# Patient Record
Sex: FEMALE | Race: BLACK OR AFRICAN AMERICAN | NOT HISPANIC OR LATINO | Employment: FULL TIME | URBAN - METROPOLITAN AREA
[De-identification: names, ages, dates, MRNs, and addresses within clinical notes are randomized per-mention and may not be internally consistent; named-entity substitution may affect disease eponyms.]

---

## 2017-01-04 ENCOUNTER — HOSPITAL ENCOUNTER (EMERGENCY)
Facility: HOSPITAL | Age: 37
Discharge: HOME | End: 2017-01-04
Attending: EMERGENCY MEDICINE
Payer: COMMERCIAL

## 2017-01-04 VITALS
RESPIRATION RATE: 12 BRPM | HEIGHT: 66 IN | SYSTOLIC BLOOD PRESSURE: 127 MMHG | HEART RATE: 79 BPM | WEIGHT: 141 LBS | TEMPERATURE: 98 F | OXYGEN SATURATION: 100 % | DIASTOLIC BLOOD PRESSURE: 73 MMHG | BODY MASS INDEX: 22.66 KG/M2

## 2017-01-04 DIAGNOSIS — K13.0 DRY LIPS: ICD-10-CM

## 2017-01-04 DIAGNOSIS — J00 RHINOPHARYNGITIS: Primary | ICD-10-CM

## 2017-01-04 LAB
B-HCG UR QL: NEGATIVE
CTP QC/QA: YES

## 2017-01-04 PROCEDURE — 25000003 PHARM REV CODE 250: Performed by: PHYSICIAN ASSISTANT

## 2017-01-04 PROCEDURE — 99284 EMERGENCY DEPT VISIT MOD MDM: CPT

## 2017-01-04 RX ORDER — CETIRIZINE HYDROCHLORIDE, PSEUDOEPHEDRINE HYDROCHLORIDE 5; 120 MG/1; MG/1
1 TABLET, FILM COATED, EXTENDED RELEASE ORAL DAILY
Qty: 30 TABLET | Refills: 0 | Status: SHIPPED | OUTPATIENT
Start: 2017-01-04 | End: 2017-01-14

## 2017-01-04 RX ORDER — KETOROLAC TROMETHAMINE 10 MG/1
10 TABLET, FILM COATED ORAL
Status: COMPLETED | OUTPATIENT
Start: 2017-01-04 | End: 2017-01-04

## 2017-01-04 RX ORDER — IBUPROFEN 600 MG/1
600 TABLET ORAL EVERY 6 HOURS PRN
Qty: 20 TABLET | Refills: 0 | Status: SHIPPED | OUTPATIENT
Start: 2017-01-04 | End: 2017-10-04

## 2017-01-04 RX ADMIN — KETOROLAC TROMETHAMINE 10 MG: 10 TABLET, FILM COATED ORAL at 05:01

## 2017-01-04 NOTE — DISCHARGE INSTRUCTIONS
Self-Care for Sore Throats  Sore throats occur for many reasons, such as colds, allergies, and infections caused by viruses or bacteria. In any case, your throat becomes red and sore. Your goal for self-care is to reduce your discomfort while giving your throat a chance to heal.    Moisten and Soothe Your Throat  · Try a sip of water first thing after waking up.  · Keep your throat moist by drinking 6 or more glasses of clear liquids every day.  · Run a cool-air humidifier in your room overnight.  · Avoid cigarette smoke.   · Suck on throat lozenges, cough drops, hard candy, ice chips, or frozen fruit-juice bars. Use the sugar-free versions if your diet or medical condition require them.  Gargle to Ease Irritation  Gargling every hour or 2 can ease irritation. Try gargling with 1 of these solutions:  · 1/4 teaspoon of salt in 1/2 cup of warm water  · An over-the-counter anesthetic gargle  Use Medication for More Relief  Over-the-counter medication can reduce sore throat symptoms. Ask your pharmacist if you have questions about which medication to use:  · Ease pain with anesthetic sprays. Aspirin or an aspirin substitute also helps. Remember, never give aspirin to anyone 18 or younger, or if you are already taking blood thinners.   · For sore throats caused by allergies, try antihistamines to block the allergic reaction.  · Remember: unless a sore throat is caused by a bacterial infection, antibiotics wont help you.  Prevent Future Sore Throats  · Stop smoking or reduce contact with secondhand smoke. Smoke irritates the tender throat lining.  · Limit contact with pets and with allergy-causing substances, such as pollen and mold.  · When youre around someone with a sore throat or cold, wash your hands frequently to keep viruses or bacteria from spreading.  · Dont strain your vocal cords.  Call Your Health Care Provider  Contact your doctor if you have:  · A temperature over 101°F (38.3°C)  · White spots on the  throat  · Great difficulty swallowing  · Trouble breathing  · A skin rash  · Recent exposure to someone else with strep bacteria  · Severe hoarseness and swollen glands in the neck or jaw   © 6069-3033 [employer]. [address] [place] [address], [place] [address]. All rights reserved. This information is not intended as a substitute for professional medical care. Always follow your healthcare professional's instructions.

## 2017-01-04 NOTE — ED PROVIDER NOTES
Encounter Date: 1/4/2017       History     Chief Complaint   Patient presents with    Sore Throat     described as burning and accompanied by lips burning x5 days; denies recent illness; denies anything making pain better or worse     Review of patient's allergies indicates:   Allergen Reactions    Bactrim [sulfamethoxazole-trimethoprim] Other (See Comments)     LOWER WBC     HPI Comments: Diana Titus 36 y.o. nontoxic female with no reported PMH presented to the ED with c/o URI symptoms for the past five days. She c/o congestion, post nasal drip, sore throat and chapped lips. She states that symptoms have gradually worsened since onset and are constant.  She denies any headache, vision changes, cough, neck pain or rigidity, SOB, wheezing, CP, N/V/D or rash. She denies any sick contacts. She reports that she is a nonsmoker and did not receive the influenza vaccine this year. She denies trying any medications today for the symptoms.    The history is provided by the patient.     History reviewed. No pertinent past medical history.  No past medical history pertinent negatives.  Past Surgical History   Procedure Laterality Date    Dilation and curettage of uterus       History reviewed. No pertinent family history.  Social History   Substance Use Topics    Smoking status: Never Smoker    Smokeless tobacco: Never Used    Alcohol use No     Review of Systems   Constitutional: Negative for activity change, appetite change, chills and fever.   HENT: Positive for congestion, postnasal drip, sinus pressure and sore throat. Negative for rhinorrhea, trouble swallowing and voice change.         Dry lips   Respiratory: Positive for cough. Negative for shortness of breath and wheezing.         Dry cough   Cardiovascular: Negative for chest pain.   Gastrointestinal: Negative for abdominal pain, nausea and vomiting.   Endocrine: Negative for polydipsia and polyuria.   Genitourinary: Negative for decreased urine volume and  dysuria.   Musculoskeletal: Negative for arthralgias, back pain and neck pain.   Skin: Negative for rash.   Neurological: Negative for dizziness, weakness, light-headedness and headaches.   Hematological: Does not bruise/bleed easily.       Physical Exam   Initial Vitals   BP Pulse Resp Temp SpO2   01/04/17 1627 01/04/17 1627 01/04/17 1627 01/04/17 1627 01/04/17 1627   127/73 79 12 98.3 °F (36.8 °C) 100 %     Physical Exam    Nursing note and vitals reviewed.  Constitutional: Vital signs are normal. She appears well-developed and well-nourished. She is cooperative.  Non-toxic appearance. She does not appear ill. No distress.   HENT:   Head: Normocephalic and atraumatic.   Right Ear: Tympanic membrane is not erythematous.   Left Ear: Tympanic membrane is not erythematous.   Nose: Mucosal edema present.   Mouth/Throat: Mucous membranes are not pale and not dry.   Minimal erythema of the oropharynx with no edema or exudate. Mucous membranes moist. Mild dry skin of lips noted   Eyes: Conjunctivae and lids are normal.   Neck: Neck supple. No rigidity.   Cardiovascular: Normal rate and regular rhythm.   Pulmonary/Chest: Breath sounds normal. No respiratory distress. She has no wheezes. She has no rhonchi.   Abdominal: Soft. Normal appearance and bowel sounds are normal. There is no tenderness. There is no rigidity and no guarding.   Musculoskeletal: Normal range of motion.   Neurological: She is alert and oriented to person, place, and time. She has normal strength. GCS eye subscore is 4. GCS verbal subscore is 5. GCS motor subscore is 6.   Skin: Skin is warm, dry and intact. No rash noted.   Psychiatric: She has a normal mood and affect. Her speech is normal and behavior is normal. Thought content normal.         ED Course   Procedures  Labs Reviewed - No data to display     Diana Sigue 36 y.o. nontoxic female with no reported PMH presented to the ED with c/o URI symptoms for the past five days. She c/o congestion,  post nasal drip, sore throat and chapped lips. She states that symptoms have gradually worsened since onset and are constant.  She denies any headache, vision changes, cough, neck pain or rigidity, SOB, wheezing, CP, N/V/D or rash. She denies any sick contacts. She reports that she is a nonsmoker and did not receive the influenza vaccine this year. She denies trying any medications today for the symptoms.  ROS positive for URI symptoms.  Physical exam reveals patient that ill however nontoxic in appearance. TM's clear, nose with edema, Patient with no erythema of the oropharynx. She does have Minimal erythema of the oropharynx with no edema or exudate. Mucous membranes moist. Mild dry skin of lips noted. Lungs clear free of wheeze or rhonchi. Heart regular rate and rhythm. Abdomen is soft and nontender with no rebound or rigidity. FROM of neck and all extremities with strength 5/5 bilaterally. Skin free of rash, pallor and diaphoresis.     DDX: influenza, viral URI, strep pharyngitis    ED management:  Patient is well appearing, afebrile and in no obvious distress. We will send home with symptomatic medications for this likely viral etiology as she is out of window for tamiflu and has a Centor score of 1.  She was counseled about symptom care, including ointment and lip balm for dry lips.    Impression/Plan: The primary encounter diagnosis was Rhinopharyngitis. A diagnosis of Dry lips was also pertinent to this visit. Discharged with motrin and zyrtec D.  Patient will follow up with Primary.  Patient cautioned on when to return to ED.  Pt. Understands and agrees with current treatment plan                         ED Course     Clinical Impression:   The primary encounter diagnosis was Rhinopharyngitis. A diagnosis of Dry lips was also pertinent to this visit.          JUDAH Sosa  01/05/17 0801

## 2017-01-04 NOTE — ED AVS SNAPSHOT
[facility]  [address]  [place] [address]               Diana Titus   2017  5:04 PM   ED    Description:  Female : 1980   Department:  [facility]           Your Care was Coordinated By:     Provider Role From To    Inocencio Harris Jr., MD Attending Provider [phone] --    JUDAH Sosa Physician Assistant 17 [phone] --      Reason for Visit     Sore Throat           Diagnoses this Visit        Comments    Rhinopharyngitis    -  Primary     Dry lips           ED Disposition     ED Disposition Condition Comment    Discharge             To Do List           Follow-up Information     Follow up with Jossy Hein MD In 1 week.    Specialty:  Internal Medicine    Contact information:    [address]  [address]  [facility]  [facility] [address]  [phone]         These Medications        Disp Refills Start End    cetirizine-pseudoephedrine 5-120 mg Tb12 30 tablet 0 2017    Take 1 tablet by mouth once daily. - Oral    Pharmacy: [facility] [address] [place] [address] Ph #: [phone]       ibuprofen (ADVIL,MOTRIN) 600 MG tablet 20 tablet 0 2017     Take 1 tablet (600 mg total) by mouth every 6 (six) hours as needed for Pain. - Oral    Pharmacy: [facility] [address] [place] [address] Ph #: [phone]         [facility]     [facility] Care [facility] -  Assistance  Registered nurses in the [facility] provide clinical advisement, health education, appointment booking, and other advisory services.  Call for this free service at [phone].             Medications           Message regarding Medications     Verify the changes and/or additions to your medication regime listed below are the same as discussed with your clinician today.  If any of these changes or additions are incorrect, please notify your healthcare  "provider.        START taking these NEW medications        Refills    cetirizine-pseudoephedrine 5-120 mg Tb12 0    Sig: Take 1 tablet by mouth once daily.    Class: Print    Route: Oral    ibuprofen (ADVIL,MOTRIN) 600 MG tablet 0    Sig: Take 1 tablet (600 mg total) by mouth every 6 (six) hours as needed for Pain.    Class: Print    Route: Oral      STOP taking these medications     methocarbamol (ROBAXIN) 750 MG Tab Take 1-2 tablets 3 times daily for pain and spasm           Verify that the below list of medications is an accurate representation of the medications you are currently taking.  If none reported, the list may be blank. If incorrect, please contact your healthcare provider. Carry this list with you in case of emergency.           Current Medications     cetirizine-pseudoephedrine 5-120 mg Tb12 Take 1 tablet by mouth once daily.    ibuprofen (ADVIL,MOTRIN) 600 MG tablet Take 1 tablet (600 mg total) by mouth every 6 (six) hours as needed for Pain.           Clinical Reference Information           Your Vitals Were     BP Pulse Temp Resp Height Weight    127/73 79 98.3 °F (36.8 °C) (Oral) 12 5' 6" (1.676 m) 64 kg (141 lb)    Last Period SpO2 BMI          12/11/2016 100% 22.76 kg/m2        Allergies as of 1/4/2017        Reactions    Bactrim [Sulfamethoxazole-trimethoprim] Other (See Comments)    LOWER WBC      Immunizations Administered on Date of Encounter - 1/4/2017     None      ED Micro, Lab, POCT     Start Ordered       Status Ordering Provider    01/04/17 0000 01/04/17 2734  POCT urine pregnancy     Comments:  This order was created through External Result Entry    Completed       ED Imaging Orders     None        Discharge Instructions         Self-Care for Sore Throats  Sore throats occur for many reasons, such as colds, allergies, and infections caused by viruses or bacteria. In any case, your throat becomes red and sore. Your goal for self-care is to reduce your discomfort while giving your " throat a chance to heal.    Moisten and Soothe Your Throat  · Try a sip of water first thing after waking up.  · Keep your throat moist by drinking 6 or more glasses of clear liquids every day.  · Run a cool-air humidifier in your room overnight.  · Avoid cigarette smoke.   · Suck on throat lozenges, cough drops, hard candy, ice chips, or frozen fruit-juice bars. Use the sugar-free versions if your diet or medical condition require them.  Gargle to Ease Irritation  Gargling every hour or 2 can ease irritation. Try gargling with 1 of these solutions:  · 1/4 teaspoon of salt in 1/2 cup of warm water  · An over-the-counter anesthetic gargle  Use Medication for More Relief  Over-the-counter medication can reduce sore throat symptoms. Ask your pharmacist if you have questions about which medication to use:  · Ease pain with anesthetic sprays. Aspirin or an aspirin substitute also helps. Remember, never give aspirin to anyone 18 or younger, or if you are already taking blood thinners.   · For sore throats caused by allergies, try antihistamines to block the allergic reaction.  · Remember: unless a sore throat is caused by a bacterial infection, antibiotics wont help you.  Prevent Future Sore Throats  · Stop smoking or reduce contact with secondhand smoke. Smoke irritates the tender throat lining.  · Limit contact with pets and with allergy-causing substances, such as pollen and mold.  · When youre around someone with a sore throat or cold, wash your hands frequently to keep viruses or bacteria from spreading.  · Dont strain your vocal cords.  Call Your Health Care Provider  Contact your doctor if you have:  · A temperature over 101°F (38.3°C)  · White spots on the throat  · Great difficulty swallowing  · Trouble breathing  · A skin rash  · Recent exposure to someone else with strep bacteria  · Severe hoarseness and swollen glands in the neck or jaw   © 8199-7845 [employer]. [address] [place] [address],  JUDAH Rodgers [address]. All rights reserved. This information is not intended as a substitute for professional medical care. Always follow your healthcare professional's instructions.          [facility] Sign-Up     Activating your [facility] account is as easy as 1-2-3!     1) Visit [email or web address], select Sign Up Now, enter this activation code and your date of birth, then select Next.  [email or web address]  Expires: 2/18/2017  5:15 PM      2) Create a username and password to use when you visit [facility] in the future and select a security question in case you lose your password and select Next.    3) Enter your e-mail address and click Sign Up!    Additional Information  If you have questions, please e-mail [email or web address] or call [phone] to talk to our [facility] staff. Remember, [facility] is NOT to be used for urgent needs. For medical emergencies, dial 911.          [facility] complies with applicable Federal civil rights laws and does not discriminate on the basis of race, color, national origin, age, disability, or sex.        Language Assistance Services     ATTENTION: Language assistance services are available, free of charge. Please call [phone].      ATENCIÓN: Si habla kimberly, tiene a sarmiento disposición servicios gratuitos de asistencia lingüística. Llame al [phone].     LISETTE Ý: N?u b?n nói Ti?ng Vi?t, có các d?ch v? h? tr? ngôn ng? mi?n phí dành cho b?n. G?i s? [phone].

## 2017-10-04 ENCOUNTER — HOSPITAL ENCOUNTER (EMERGENCY)
Facility: HOSPITAL | Age: 37
Discharge: HOME | End: 2017-10-04
Attending: EMERGENCY MEDICINE
Payer: COMMERCIAL

## 2017-10-04 VITALS
HEART RATE: 78 BPM | TEMPERATURE: 98 F | OXYGEN SATURATION: 97 % | BODY MASS INDEX: 24.27 KG/M2 | SYSTOLIC BLOOD PRESSURE: 113 MMHG | WEIGHT: 151 LBS | HEIGHT: 66 IN | DIASTOLIC BLOOD PRESSURE: 71 MMHG | RESPIRATION RATE: 20 BRPM

## 2017-10-04 DIAGNOSIS — H10.9 CONJUNCTIVITIS OF BOTH EYES, UNSPECIFIED CONJUNCTIVITIS TYPE: Primary | ICD-10-CM

## 2017-10-04 PROCEDURE — 99283 EMERGENCY DEPT VISIT LOW MDM: CPT

## 2017-10-04 RX ORDER — CIPROFLOXACIN HYDROCHLORIDE 3 MG/ML
1 SOLUTION/ DROPS OPHTHALMIC
Qty: 5 ML | Refills: 0 | Status: SHIPPED | OUTPATIENT
Start: 2017-10-04 | End: 2017-10-04

## 2017-10-04 RX ORDER — CIPROFLOXACIN HYDROCHLORIDE 3 MG/ML
1 SOLUTION/ DROPS OPHTHALMIC
Qty: 5 ML | Refills: 0 | Status: SHIPPED | OUTPATIENT
Start: 2017-10-04 | End: 2019-01-07

## 2017-10-04 NOTE — ED PROVIDER NOTES
Encounter Date: 10/4/2017       History     Chief Complaint   Patient presents with    Eye Problem     redness, itching, drainage to lt. eye x3 days.      CHIEF COMPLAINT: Patient presents with: redness, itching, drainage to lt. eye x3 days.     HISTORY OF PRESENT ILLNESS: Diana Titus who is a 37 y.o. presents to the emergency department today with complaint of redness itching and dryness to her left eye for last 3 days.  She denies any change in vision.  She denies any pain with her extraocular movements.  She has not had any fever, chills or sweats.  She does wake up and have crusting to the left eye.  She does note that she started to experience little bit of increased tearing to the right eye and noticed some redness of the temporal aspect of the right eye.    REVIEW OF SYSTEMS:  Constitutional: No fever, no chills.  Eyes: See above.  HENT: No nasal drainage. No ear ache. No sore throat.   Cardiovascular: No chest pain, no palpitations.  Respiratory: No cough, no shortness of breath.  Gastrointestinal: No abdominal pain, no vomiting. No diarrhea  Genitourinary: No hematuria, dysuria, urgency.  Musculoskeletal: No back pain.  Skin: No rashes, no lesions.  Neurological: No headache, no focal weakness.    Otherwise remaining ROS negative     ALLERGIES REVIEWED  MEDICATIONS REVIEWED  PMH/PSH/SOC/FH REVIEWED     The history is provided by the patient.    Nursing/Ancillary staff note reviewed.                  Review of patient's allergies indicates:   Allergen Reactions    Bactrim [sulfamethoxazole-trimethoprim] Other (See Comments)     LOWER WBC     No past medical history on file.  Past Surgical History:   Procedure Laterality Date    DILATION AND CURETTAGE OF UTERUS       No family history on file.  Social History   Substance Use Topics    Smoking status: Never Smoker    Smokeless tobacco: Never Used    Alcohol use No     Review of Systems   All other systems reviewed and are negative.      Physical Exam      Initial Vitals [10/04/17 0017]   BP Pulse Resp Temp SpO2   113/71 75 16 98.7 °F (37.1 °C) 97 %      MAP       85         Physical Exam    Nursing note and vitals reviewed.  Constitutional: She appears well-developed and well-nourished.   HENT:   Head: Normocephalic and atraumatic.   Nose: Nose normal.   Mouth/Throat: Oropharynx is clear and moist.   Eyes: EOM are normal. Pupils are equal, round, and reactive to light. No scleral icterus.   Erythema to the left conjunctiva on both the temporal and nasal aspects. + crusting at eyelashes. Erythema to right at temporal aspect.  No pain with extraocular movements.   Neck: Normal range of motion. Neck supple. No JVD present.   Cardiovascular: Normal rate, regular rhythm, normal heart sounds and intact distal pulses. Exam reveals no gallop and no friction rub.    No murmur heard.  Pulmonary/Chest: Breath sounds normal. No stridor. No respiratory distress. She has no wheezes. She exhibits no tenderness.   Abdominal: Soft. Bowel sounds are normal. She exhibits no distension and no mass. There is no tenderness. There is no rebound and no guarding.   Musculoskeletal: Normal range of motion. She exhibits no edema or tenderness.   Neurological: She is alert and oriented to person, place, and time. She has normal strength. No cranial nerve deficit.   Skin: Skin is warm and dry. No rash noted. No pallor.   Psychiatric: She has a normal mood and affect. Thought content normal.         ED Course   Procedures  Labs Reviewed - No data to display          Medical Decision Making:   Differential Diagnosis:   Conjunctivitis, allergies, perioribital cellulitis, orbital cellulitis  ED Management:  This is a 37 year female presents to the emergency Department today with conjunctivitis.  She has a fairly typical presentation.  She has no pain with extraocular movements.  She has no eyelid edema.  At this time we'll treat her for her conjunctivitis and have her follow-up with her primary  care physician.  Patient is comfortable with this plan and comfortable going home at this time. After taking into careful account the historical factors and physical exam findings of the patient's presentation today no acute emergent medical condition has been identified. The patient appears to be low risk for an emergent medical condition and I feel it is safe and appropriate at this time for the patient to be discharged to follow-up as detailed in their discharge instructions for reevaluation and possible continued outpatient workup and management. I have discussed the specifics of the workup with the patient and the patient has verbalized understanding of the details of the workup, the diagnosis, the treatment plan, and the need for outpatient follow-up.  Although the patient has no emergent etiology today this does not preclude the development of an emergent condition so in addition, I have advised the patient that they can return to the ED and/or activate EMS at any time with worsening of their symptoms, change of their symptoms, or with any other medical complaint.  The patient remained comfortable and stable during their visit in the ED.  Discharge and follow-up instructions discussed with the patient who expressed understanding and willingness to comply with my recommendations.     This medical record was prepared using voice dictation software. There may be phonetic errors.                   ED Course      Clinical Impression:   The encounter diagnosis was Conjunctivitis of both eyes, unspecified conjunctivitis type.                           Linda Gilman MD  10/04/17 8544

## 2017-10-04 NOTE — ED NOTES
Patient identifiers for Shauntell Sigue checked and correct.  LOC: The patient is awake, alert and aware of environment with an appropriate affect, the patient is oriented x 3 and speaking appropriately.  APPEARANCE: Patient resting comfortably and in no acute distress, patient is clean and well groomed, patient's clothing are properly fastened. Left eye red.  SKIN: The skin is warm and dry, patient has normal skin turgor and moist mucus membranes.  MUSKULOSKELETAL: Patient moving all extremities well, no obvious swelling or deformities noted.

## 2017-10-04 NOTE — ED NOTES
Complaining of itching and redness to left eye x 3 days.  States she wakes open crust is around eye.  Denies any blurred vision.

## 2019-01-07 ENCOUNTER — HOSPITAL ENCOUNTER (EMERGENCY)
Facility: HOSPITAL | Age: 39
Discharge: HOME | End: 2019-01-07
Payer: COMMERCIAL

## 2019-01-07 VITALS
RESPIRATION RATE: 15 BRPM | WEIGHT: 153 LBS | OXYGEN SATURATION: 98 % | SYSTOLIC BLOOD PRESSURE: 123 MMHG | DIASTOLIC BLOOD PRESSURE: 72 MMHG | TEMPERATURE: 99 F | BODY MASS INDEX: 24.69 KG/M2 | HEART RATE: 88 BPM

## 2019-01-07 DIAGNOSIS — M25.562 ACUTE PAIN OF LEFT KNEE: Primary | ICD-10-CM

## 2019-01-07 DIAGNOSIS — S83.92XA SPRAIN OF LEFT KNEE, UNSPECIFIED LIGAMENT, INITIAL ENCOUNTER: ICD-10-CM

## 2019-01-07 DIAGNOSIS — M25.462 EFFUSION OF LEFT KNEE: ICD-10-CM

## 2019-01-07 PROCEDURE — 99283 EMERGENCY DEPT VISIT LOW MDM: CPT | Mod: ER

## 2019-01-07 PROCEDURE — 25000003 PHARM REV CODE 250: Mod: ER | Performed by: PHYSICIAN ASSISTANT

## 2019-01-07 RX ORDER — IBUPROFEN 600 MG/1
600 TABLET ORAL
Status: COMPLETED | OUTPATIENT
Start: 2019-01-07 | End: 2019-01-07

## 2019-01-07 RX ORDER — NAPROXEN 500 MG/1
500 TABLET ORAL 2 TIMES DAILY WITH MEALS
Qty: 14 TABLET | Refills: 0 | Status: SHIPPED | OUTPATIENT
Start: 2019-01-07 | End: 2019-01-14

## 2019-01-07 RX ADMIN — IBUPROFEN 600 MG: 600 TABLET, FILM COATED ORAL at 04:01

## 2019-01-07 NOTE — DISCHARGE INSTRUCTIONS
Elevate and ice for discomfort.  Wear compression to assist in support and stabilization.  Take prescribed medication as directed as needed for discomfort.  Ambulate and range as tolerated.  Follow up with PCP for continued care and management.

## 2019-01-07 NOTE — ED PROVIDER NOTES
Encounter Date: 1/7/2019       History     Chief Complaint   Patient presents with    Knee Pain     knee pain for one week      Patient is a 38-year-old female presenting to ED today with complaint of left knee pain x1 week.  Patient denies any injury or trauma, slips or falls, twisting injury or any past history of left knee injury. Patient reports able to ambulate despite her discomfort.  Patient reports mild swelling noted last week although it has since improved.  Patient does admit that she is on her feet all day long daily.  Patient denies any numbness, tingling, fevers, sweats, chills, chest pain, shortness of breath or any other physical complaints at this time.  No alleviating factors noted.  No OTC meds attempted for relief.      The history is provided by the patient.     Review of patient's allergies indicates:   Allergen Reactions    Bactrim [sulfamethoxazole-trimethoprim] Other (See Comments)     LOWER WBC     No past medical history on file.  Past Surgical History:   Procedure Laterality Date    DILATION AND CURETTAGE OF UTERUS       No family history on file.  Social History     Tobacco Use    Smoking status: Never Smoker    Smokeless tobacco: Never Used   Substance Use Topics    Alcohol use: No    Drug use: No     Review of Systems   Constitutional: Negative for chills, diaphoresis, fatigue and fever.   HENT: Negative for congestion, ear pain, sinus pain, sore throat and trouble swallowing.    Eyes: Negative for photophobia, pain, redness and visual disturbance.   Respiratory: Negative for cough, shortness of breath, wheezing and stridor.    Cardiovascular: Negative for chest pain, palpitations and leg swelling.   Gastrointestinal: Negative for abdominal pain, diarrhea, nausea and vomiting.   Endocrine: Negative.    Genitourinary: Negative for difficulty urinating, dysuria, flank pain and hematuria.   Musculoskeletal: Positive for arthralgias. Negative for back pain, myalgias and neck pain.         L knee pain   Skin: Negative.    Allergic/Immunologic: Negative.    Neurological: Negative for dizziness, tremors, weakness, numbness and headaches.   Hematological: Negative.    Psychiatric/Behavioral: Negative.        Physical Exam     Initial Vitals [01/07/19 1610]   BP Pulse Resp Temp SpO2   123/72 88 15 98.6 °F (37 °C) 98 %      MAP       --         Physical Exam    Nursing note and vitals reviewed.  Constitutional: Vital signs are normal. She appears well-developed and well-nourished. She is not diaphoretic. No distress.   Patient is a 38-year-old female sitting upright in no acute distress, nontoxic, AAO x4 and breathing comfortably on room air.  Normal steady gait on ambulation to ED room.   HENT:   Head: Normocephalic and atraumatic.   Right Ear: External ear normal.   Left Ear: External ear normal.   Nose: Nose normal.   Mouth/Throat: Oropharynx is clear and moist.   Eyes: Conjunctivae and EOM are normal. Pupils are equal, round, and reactive to light.   Neck: Trachea normal and normal range of motion. Neck supple.   Cardiovascular: Normal rate, regular rhythm, normal heart sounds, intact distal pulses and normal pulses.   Pulses:       Radial pulses are 2+ on the right side, and 2+ on the left side.        Dorsalis pedis pulses are 2+ on the right side, and 2+ on the left side.        Posterior tibial pulses are 2+ on the right side, and 2+ on the left side.   Pulmonary/Chest: Effort normal and breath sounds normal. No accessory muscle usage or stridor. No tachypnea. No respiratory distress. She has no decreased breath sounds. She has no wheezes. She exhibits no tenderness.   Abdominal: Soft. Bowel sounds are normal. She exhibits no distension. There is no tenderness. There is no rigidity, no rebound, no guarding and no CVA tenderness.   Musculoskeletal: Normal range of motion. She exhibits no edema or tenderness.   Patient with complaint of left knee pain although she has full flexion and  extension despite her discomfort, normal patellar tracking, no joint laxity noted in varus and valgus testing and I am unable to appreciate any tenderness to palpation.  There is a tiny medial joint line effusion present noted although there is no erythema, no warmth, no concern for septic arthritis.  No Baker cyst noted. Negative meniscal testing.  No tibial or femur bony tenderness noted. No bony deformity noted. Patient with normal steady gait, distal pulses intact and normal sensation throughout.  No imaging indicated at this time, likely overuse injury versus sprain.   Lymphadenopathy:     She has no cervical adenopathy.   Neurological: She is alert and oriented to person, place, and time. She has normal strength. She displays no tremor. No sensory deficit. She exhibits normal muscle tone. She displays no seizure activity. Coordination normal. GCS score is 15. GCS eye subscore is 4. GCS verbal subscore is 5. GCS motor subscore is 6.   Neurovascularly intact   Skin: Skin is warm and dry. Capillary refill takes less than 2 seconds. No rash noted. No erythema.         ED Course   Procedures  Labs Reviewed - No data to display       Imaging Results    None          Medical Decision Making:   Initial Assessment:   Patient is a 38-year-old female presenting to ED today with complaint of left knee pain x1 week.  Patient denies any injury or trauma, slips or falls, twisting injury or any past history of left knee injury. Patient reports able to ambulate despite her discomfort.  Patient reports mild swelling noted last week although it has since improved.  Patient does admit that she is on her feet all day long daily.  Patient denies any numbness, tingling, fevers, sweats, chills, chest pain, shortness of breath or any other physical complaints at this time.  No alleviating factors noted.  No OTC meds attempted for relief.  Differential Diagnosis:   Overuse injury  Left knee sprain  Arthritis    ED  Management:  Discussed care plan with patient.  Discussed clinical exam findings and left knee with tiny medial joint line effusion although she has full active range of motion with no apparent discomfort wall ranging, she has a normal steady gait, distal pulses intact, normal sensation throughout, no palpable tenderness and she is unable to to point any focal tenderness. No discoloration, no erythema, no warmth, no concern for septic arthritis.  Normal patellar tracking and no joint laxity.  Patient has a very stable on impressive knee joint on clinical exam.  No imaging indicated at this time, especially in the setting of nontraumatic discomfort with a normal steady gait.  Patient likely has an overuse injury versus sprain.  Patient will be treated symptomatically with anti-inflammatories and she was instructed to follow up with PCP for continued care and management.  Patient is stable, no acute distress, nontoxic, neurovascular intact, vital signs stable, all questions answered.                      Clinical Impression:   The primary encounter diagnosis was Acute pain of left knee. Diagnoses of Sprain of left knee, unspecified ligament, initial encounter and Effusion of left knee were also pertinent to this visit.                             Afshan Vanessa PA-C  01/07/19 5818

## 2020-12-09 ENCOUNTER — HOSPITAL ENCOUNTER (EMERGENCY)
Facility: HOSPITAL | Age: 40
Discharge: HOME | End: 2020-12-09
Payer: COMMERCIAL

## 2020-12-09 VITALS
SYSTOLIC BLOOD PRESSURE: 109 MMHG | OXYGEN SATURATION: 97 % | DIASTOLIC BLOOD PRESSURE: 69 MMHG | BODY MASS INDEX: 26.47 KG/M2 | HEART RATE: 65 BPM | RESPIRATION RATE: 16 BRPM | TEMPERATURE: 98 F | WEIGHT: 164 LBS

## 2020-12-09 DIAGNOSIS — Z77.098 CHEMICAL EXPOSURE OF EYE: Primary | ICD-10-CM

## 2020-12-09 PROCEDURE — 25000003 PHARM REV CODE 250: Performed by: NURSE PRACTITIONER

## 2020-12-09 PROCEDURE — 99283 EMERGENCY DEPT VISIT LOW MDM: CPT

## 2020-12-09 RX ORDER — ERYTHROMYCIN 5 MG/G
OINTMENT OPHTHALMIC
Status: COMPLETED | OUTPATIENT
Start: 2020-12-09 | End: 2020-12-09

## 2020-12-09 RX ORDER — PROPARACAINE HYDROCHLORIDE 5 MG/ML
1 SOLUTION/ DROPS OPHTHALMIC
Status: COMPLETED | OUTPATIENT
Start: 2020-12-09 | End: 2020-12-09

## 2020-12-09 RX ORDER — ERYTHROMYCIN 5 MG/G
OINTMENT OPHTHALMIC
Qty: 1 TUBE | Refills: 0 | Status: SHIPPED | OUTPATIENT
Start: 2020-12-09

## 2020-12-09 RX ADMIN — FLUORESCEIN SODIUM 1 EACH: 1 STRIP OPHTHALMIC at 11:12

## 2020-12-09 RX ADMIN — ERYTHROMYCIN 1 INCH: 5 OINTMENT OPHTHALMIC at 12:12

## 2020-12-09 RX ADMIN — PROPARACAINE HYDROCHLORIDE 1 DROP: 5 SOLUTION/ DROPS OPHTHALMIC at 11:12

## 2020-12-09 NOTE — ED PROVIDER NOTES
Encounter Date: 12/9/2020       History     Chief Complaint   Patient presents with    Eye Pain     p[t reports was at work loading boxes and lysol spilled onto face. reports lysol split in left eye and mouth but reports did not swallow it. reprots pain to left eye and blurry vision. happened around 0900 today      40-year-old female presents emergency room with reports of having lysol sprayed into the right eye when at work this morning. 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 Reports rinsing the eye with flush prior to arrival. Pt also reports improvement since washing it out however is still having occasional burning/pain.     The history is provided by the patient. No  was used.     Review of patient's allergies indicates:   Allergen Reactions    Bactrim [sulfamethoxazole-trimethoprim] Other (See Comments)     LOWER WBC     No past medical history on file.  Past Surgical History:   Procedure Laterality Date    DILATION AND CURETTAGE OF UTERUS       No family history on file.  Social History     Tobacco Use    Smoking status: Never Smoker    Smokeless tobacco: Never Used   Substance Use Topics    Alcohol use: No    Drug use: No     Review of Systems   Eyes: Positive for pain and redness.   All other systems reviewed and are negative.      Physical Exam     Initial Vitals [12/09/20 1047]   BP Pulse Resp Temp SpO2   (!) 160/70 75 20 98.5 °F (36.9 °C) 100 %      MAP       --         Physical Exam    Nursing note and vitals reviewed.  Constitutional: She appears well-developed and well-nourished.   HENT:   Head: Normocephalic.   Right Ear: Hearing and tympanic membrane normal.   Left Ear: Hearing and tympanic membrane normal.   Nose: Nose normal.   Mouth/Throat: Oropharynx is clear and moist.   Eyes: EOM and lids are normal. Pupils are equal, round, and reactive to light. Right eye exhibits no discharge. No foreign body present in the right eye.  Right conjunctiva has no hemorrhage. Left conjunctiva has no hemorrhage. Right eye exhibits normal extraocular motion and no nystagmus. Left eye exhibits normal extraocular motion and no nystagmus.   Slit lamp exam:       The right eye shows no corneal abrasion, no corneal flare, no corneal ulcer and no foreign body.   Neck: Normal range of motion. No neck rigidity.   Cardiovascular: Normal rate.   Pulmonary/Chest: Breath sounds normal. No respiratory distress. She has no wheezes. She has no rhonchi.   Abdominal: Soft. There is no abdominal tenderness.   Musculoskeletal: Normal range of motion.   Neurological: She is alert and oriented to person, place, and time.   Skin: Skin is warm and dry. No rash noted.   Psychiatric: She has a normal mood and affect. Her behavior is normal. Judgment and thought content normal.         ED Course   Procedures  Labs Reviewed - No data to display       Imaging Results    None          Medical Decision Making:   Initial Assessment:   40-year-old female presents emergency room with reports of having lysol sprayed into the right eye when at work this morning. 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 Reports rinsing the eye with flush prior to arrival. Pt also reports improvement since washing it out however is still having occasional burning/pain.     The history is provided by the patient. No  was used.     ED Management:  ED Course as of Dec 09 1217  Wed Dec 09, 2020  1144 Pt states she is not able to perform a visual exam due to not having her glasses which she needs to see.     (DT)  1213 Eye was irrigated well, No corneal abrasion or ulceration noted. Pt tolerated eye exam well. Minimal erythema noted. EOMI, She will be referred to opthalmology and will be prescribed erythromycin ointment, She is stable at this time for dc     (DT)    ED Course User Index  (DT) Ingris Munoz NP                     ED Course as of  Dec 09 1217   Wed Dec 09, 2020   1144 Pt states she is not able to perform a visual exam due to not having her glasses which she needs to see.     [DT]   1213 Eye was irrigated well, No corneal abrasion or ulceration noted. Pt tolerated eye exam well. Minimal erythema noted. EOMI, She will be referred to opthalmology and will be prescribed erythromycin ointment, She is stable at this time for dc     [DT]      ED Course User Index  [DT] Ingris Munoz NP            Clinical Impression:     ICD-10-CM ICD-9-CM   1. Chemical exposure of eye  Z77.098 V87.2                          ED Disposition Condition    Discharge Stable        ED Prescriptions     Medication Sig Dispense Start Date End Date Auth. Provider    erythromycin (ROMYCIN) ophthalmic ointment Place a 1/2 inch ribbon of ointment into the lower eyelid. 1 Tube 12/9/2020  Ingris Munoz NP        Follow-up Information     Follow up With Specialties Details Why Contact Info    Jossy Hein MD Internal Medicine Schedule an appointment as soon as possible for a visit in 2 days  [address]  [address]  [facility] [address]  [phone]                                         Ingris Munoz NP  12/09/20 1218

## 2020-12-09 NOTE — ED NOTES
Pt was unable to perform the visual acuity at this time.Pt reports she was driven here by someone and her glasses are in her car at the job parking lot. Provider notified.

## 2020-12-09 NOTE — DISCHARGE INSTRUCTIONS
Referral sent to eye doctor. You will need close follow up. Use meds as directed. Return to the ER for any worsening of condition.

## 2020-12-09 NOTE — ED NOTES
Pt. Was at work this morning and while she was pulling a box of  down from the shelf she was splashed in the eyes and face with lysol . She flushed her eyes immediately. She c/o some mild burning to Rt. Eye. Minimal redness, irritation noted to eyes. No tearing.

## 2022-01-28 ENCOUNTER — LAB VISIT (OUTPATIENT)
Dept: PRIMARY CARE CLINIC | Facility: OTHER | Age: 42
End: 2022-01-28
Attending: INTERNAL MEDICINE

## 2022-01-28 DIAGNOSIS — U07.1 COVID-19: Primary | ICD-10-CM

## 2022-01-28 LAB
CTP QC/QA: YES
SARS-COV-2 AG RESP QL IA.RAPID: NEGATIVE

## 2022-01-28 PROCEDURE — 87811 SARS-COV-2 COVID19 W/OPTIC: CPT

## 2022-01-28 NOTE — PROGRESS NOTES
Nasal specimen collected.   BinaxNOW test performed in the presence of patient and results loaded into EPIC. Pt instructed with following instructions:.  Instructions for Patients with Confirmed or Suspected COVID-19    If you are awaiting your test result, you will either be called or it will be released to the patient portal.  If you have any questions about your test, please visit [email or web address] or call our COVID-19 information line at [phone].      Please isolate yourself at home.  You may leave home and/or return to work once the following conditions are met:    If you were not hospitalized and are not severely immunocompromised*:   More than 10 days since symptoms first appeared AND   More than 24 hours fever free without medications AND   Symptoms have improved     If you were hospitalized OR are severely immunocompromised*:   More than 20 days since symptoms first appeared   More than 24 hours fever free without medications   Symptoms have improved    If you had no symptoms but tested positive:   More than 10 days since the date of the first positive test (20 days if severely immunocompromised).   If you develop symptoms, then use the guidelines above.     *Definition of severely immunocompromised:  - Current chemotherapy for cancer  - Untreated HIV with CD4 count less than 200  - Combined primary immunodeficiency disorder  - Prednisone more than 20 mg per day for more than 14 days  - Post-transplant patients

## 2023-02-22 DIAGNOSIS — Z12.31 SCREENING MAMMOGRAM, ENCOUNTER FOR: Primary | ICD-10-CM

## 2023-04-01 ENCOUNTER — HOSPITAL ENCOUNTER (EMERGENCY)
Facility: HOSPITAL | Age: 43
Discharge: HOME | End: 2023-04-01
Attending: EMERGENCY MEDICINE
Payer: COMMERCIAL

## 2023-04-01 DIAGNOSIS — N39.0 URINARY TRACT INFECTION WITH HEMATURIA, SITE UNSPECIFIED: Primary | ICD-10-CM

## 2023-04-01 DIAGNOSIS — R31.9 URINARY TRACT INFECTION WITH HEMATURIA, SITE UNSPECIFIED: Primary | ICD-10-CM

## 2023-04-01 PROCEDURE — 99283 EMERGENCY DEPT VISIT LOW MDM: CPT | Mod: ER

## 2023-04-01 RX ORDER — CEPHALEXIN 500 MG/1
CAPSULE ORAL
Status: DISPENSED
Start: 2023-04-01 | End: 2023-04-01

## 2023-04-01 NOTE — ED PROVIDER NOTES
Emergency Department Encounter  Provider Note  Encounter Date: 4/1/2023    Patient Name: Diana Titus  MRN: [id number]    History of Present Illness   HPI  History of Present Illness:    Chief Complaint: No chief complaint on file.      42f pw 2d of LLQ pain radiating to suprapubic area, dysuria similar to previous UTI sxs. no fevers, chills, back pain. took nothing for her sxs. denies n/v, diarrhea, sick contacts. denies vaginal bleeding, discharge. LMP 3/10.     The following PMH/PSH/SocHx/FamHx has been reviewed by myself:    No past medical history on file.  Past Surgical History:   Procedure Laterality Date    DILATION AND CURETTAGE OF UTERUS       Social History     Tobacco Use    Smoking status: Never    Smokeless tobacco: Never   Substance Use Topics    Alcohol use: No    Drug use: No     No family history on file.    Allergies reviewed:  Review of patient's allergies indicates:   Allergen Reactions    Bactrim [sulfamethoxazole-trimethoprim] Other (See Comments)     LOWER WBC       Medications reviewed:  Medication List with Changes/Refills   Current Medications    ERYTHROMYCIN (ROMYCIN) OPHTHALMIC OINTMENT    Place a 1/2 inch ribbon of ointment into the lower eyelid.       ROS  Review of Systems:    Constitutional:  Negative for fever.   HENT:  Negative for sore throat.    Eyes:  Negative for redness.   Respiratory:  Negative for shortness of breath.    Cardiovascular:  Negative for chest pain.   Gastrointestinal:  Negative for nausea.   Genitourinary:  Positive for dysuria.   Musculoskeletal:  Negative for back pain.   Skin:  Negative for rash.   Neurological:  Negative for weakness.   Hematological:  Does not bruise/bleed easily.   Psychiatric/Behavioral:  The patient is not nervous/anxious.      Physical Exam   Physical Exam    Initial Vitals   BP Pulse Resp Temp SpO2   -- -- -- -- --      MAP       --           Triage vital signs reviewed.    Constitutional: Well-nourished, well-developed. Not in acute  distress.  HENT: Normocephalic, atraumatic. Moist mucous membranes.  Eyes: No conjunctival injection.  Resp: Normal respiratory effort, breathing unlabored.  Cardio: Regular rate and rhythm.  GI: Abdomen non-distended. LLQ, suprapubic ttp, non peritoneal  MSK: Extremities without any deformities noted. No lower extremity edema.  Skin: Warm and dry. No rashes or lesions noted.  Neuro: Awake and alert. Moves all extremities.    ED Course   Procedures    Medical Decision Making    History Acquisition     The history is provided by the patient.     Review of prior external/non ED notes:     Differential Diagnoses   Based on available information and initial assessment, the working differential diagnoses considered during this evaluation include but are not limited to   UTI/pyelonephritis, kidney stone, urinary retention, urethritis, appendicitis, prostatitis, testicular torsion/mass, incarcerated/strangulated hernia.  .    EKG   EKG ordered and independently reviewed by me:       Labs   Lab tests ordered and independently reviewed by me:    Labs Reviewed - No data to display  UA: nitrite+ ua, rbcs, wbcs    Imaging   Imaging ordered and independently reviewed by me:   Imaging Results    None              Additional Consideration   Diana Titus  presents to the emergency Department today with dysuria, lower abd pain. UA grossly infected. Keflex, dc. No indication for imaging or bloodwork. Pt was seen during Epic downtime; paper rx for keflex given.     Additional testing considered but not indicated during the course of this workup: further imaging and labwork, not indicated  Co-morbidities/chronic illness/exacerbation of chronic illness considered which impacted clinical decision making: none  Procedures done in the ED or plan for the OR: No  Social determinants of care considered during development of treatment plan include: Decreased medical literacy  Discussion of management or test interpretation with external  provider: No  DNR discussion: No    The patient's list of active medications and allergies as documented per RN staff has been reviewed.  Medications given in the ED and/or prescribed: Medications - No data to display               Explanation of disposition: discharge     Clinical Impression:     1. Urinary tract infection with hematuria, site unspecified         All results from the workup were reviewed with the patient/family in detail. I discussed with the patient and/or the family/caregiver that today's evaluation in the ED does not suggest any emergent or life-threatening medical conditions that would require hospitalization or immediate intervention beyond what was provided in the ED. I believe the patient is safe for discharge.  However, a reassuring evaluation in the ED does not preclude the presence or development of a serious or life-threatening condition. As such, strict return precautions were discussed with the patient expressing understanding of instructions, and all questions answered. The patient/family communicates understanding of all this information and all remaining questions and concerns were addressed at this time.    The patient/family has been provided with verbal and printed direction regarding our final diagnosis(es) as well as instructions regarding use of OTC and/or Rx medications intended to manage the patient's aforementioned conditions including:  ED Prescriptions    None       The patient's condition does not warrant review of the  and prescription of controlled substances.      ED Disposition Condition    Discharge                Tamara Burgos MD  04/01/23 0507

## 2023-04-01 NOTE — ED NOTES
This patient was seen during [facility] down time  Chart is to be scanned into medical records.  Please refer there for pt's visit  Please refer to MD notes as well

## 2023-07-02 ENCOUNTER — HOSPITAL ENCOUNTER (EMERGENCY)
Facility: HOSPITAL | Age: 43
Discharge: HOME | End: 2023-07-02
Attending: STUDENT IN AN ORGANIZED HEALTH CARE EDUCATION/TRAINING PROGRAM
Payer: COMMERCIAL

## 2023-07-02 VITALS
OXYGEN SATURATION: 98 % | HEART RATE: 75 BPM | BODY MASS INDEX: 30.02 KG/M2 | SYSTOLIC BLOOD PRESSURE: 131 MMHG | TEMPERATURE: 99 F | WEIGHT: 186 LBS | DIASTOLIC BLOOD PRESSURE: 75 MMHG | RESPIRATION RATE: 17 BRPM

## 2023-07-02 DIAGNOSIS — S99.912A LEFT ANKLE INJURY: ICD-10-CM

## 2023-07-02 PROBLEM — R60.9 EDEMA: Status: ACTIVE | Noted: 2021-09-07

## 2023-07-02 PROBLEM — R60.0 LOCALIZED EDEMA: Status: ACTIVE | Noted: 2021-09-09

## 2023-07-02 LAB
B-HCG UR QL: NEGATIVE
CTP QC/QA: YES

## 2023-07-02 PROCEDURE — 25000003 PHARM REV CODE 250: Mod: ER | Performed by: PHYSICIAN ASSISTANT

## 2023-07-02 PROCEDURE — 99283 EMERGENCY DEPT VISIT LOW MDM: CPT | Mod: ER

## 2023-07-02 PROCEDURE — 81025 URINE PREGNANCY TEST: CPT | Mod: ER | Performed by: PHYSICIAN ASSISTANT

## 2023-07-02 RX ORDER — ACETAMINOPHEN 500 MG
1000 TABLET ORAL
Status: COMPLETED | OUTPATIENT
Start: 2023-07-02 | End: 2023-07-02

## 2023-07-02 RX ORDER — NAPROXEN 500 MG/1
500 TABLET ORAL 2 TIMES DAILY WITH MEALS
Qty: 14 TABLET | Refills: 0 | Status: SHIPPED | OUTPATIENT
Start: 2023-07-02

## 2023-07-02 RX ADMIN — ACETAMINOPHEN 1000 MG: 500 TABLET ORAL at 05:07

## 2023-07-02 NOTE — ED PROVIDER NOTES
Encounter Date: 7/2/2023       History     Chief Complaint   Patient presents with    Ankle Pain     Reports fall yesterday and is still having L ankle pain. No meds today for pain. +swelling to ankle      HPI: Diana Titus, a 42 y.o. female  has no past medical history on file.     She presents to the ED for evaluation of left ankle pain after inversion injury yesterday.  Pain with ambulation and touch.  Lateral ankle swelling also noted.  No previous history of fracture or surgery to site.  Tried Tylenol with little improvement of her pain.        The history is provided by the patient.   Review of patient's allergies indicates:   Allergen Reactions    Bactrim [sulfamethoxazole-trimethoprim] Other (See Comments)     LOWER WBC     History reviewed. No pertinent past medical history.  Past Surgical History:   Procedure Laterality Date    DILATION AND CURETTAGE OF UTERUS       History reviewed. No pertinent family history.  Social History     Tobacco Use    Smoking status: Never    Smokeless tobacco: Never   Substance Use Topics    Alcohol use: No    Drug use: No     Review of Systems   Constitutional:  Negative for fever.   Musculoskeletal:  Positive for arthralgias and joint swelling.   Skin:  Negative for color change and rash.   Neurological:  Negative for weakness and numbness.   All other systems reviewed and are negative.    Physical Exam     Initial Vitals [07/02/23 1656]   BP Pulse Resp Temp SpO2   131/75 75 17 98.7 °F (37.1 °C) 98 %      MAP       --         Physical Exam    Nursing note and vitals reviewed.  Constitutional: She appears well-developed and well-nourished. She is not diaphoretic. No distress.   HENT:   Head: Normocephalic and atraumatic.   Right Ear: External ear normal.   Left Ear: External ear normal.   Nose: Nose normal.   Eyes: Conjunctivae and EOM are normal.   Neck:   Normal range of motion.  Cardiovascular:  Normal rate and regular rhythm.           Pulmonary/Chest: No respiratory  distress.   Musculoskeletal:      Cervical back: Normal range of motion.      Left ankle: Tenderness present over the lateral malleolus. No proximal fibula tenderness. Decreased range of motion. Normal pulse.      Left Achilles Tendon: Normal.     Neurological: She is alert and oriented to person, place, and time.   Skin: Skin is warm. Capillary refill takes less than 2 seconds. No rash noted.   Psychiatric: She has a normal mood and affect. Thought content normal.       ED Course   Procedures  Labs Reviewed   POCT URINE PREGNANCY          Imaging Results              X-Ray Ankle Complete Left (Final result)  Result time 07/02/23 18:18:27      Final result by Zafar Banks MD (07/02/23 18:18:27)                   Impression:      Soft tissue swelling without acute osseous abnormality.      Electronically signed by: Zafar Banks  Date:    07/02/2023  Time:    18:18               Narrative:    EXAMINATION:  XR ANKLE COMPLETE 3 VIEW LEFT    CLINICAL HISTORY:  Unspecified injury of left ankle, initial encounter    TECHNIQUE:  AP, lateral and oblique views of the left ankle were performed.    COMPARISON:  None    FINDINGS:  No fracture.  No traumatic malalignment.  No osseous destructive process.  Soft tissue swelling.                                       Medications   acetaminophen tablet 1,000 mg (1,000 mg Oral Given 7/2/23 1794)     Medical Decision Making:   Initial Assessment:   Left ankle pain   Differential Diagnosis:   Fracture, contusion, sprain  Clinical Tests:   Radiological Study: Ordered and Reviewed  ED Management:  Patient presents to the ER for evaluation of left ankle pain.  NVI.  Given ice and Tylenol.  No acute findings on x-ray.  Patient was given information on symptomatic care at home.  Encouraged to obtain repeat x-ray if no improvement and to return with any new or worsening symptoms.                        Clinical Impression:   Final diagnoses:  [S99.912A] Left ankle injury                Genna Donnelly PA-C  07/02/23 1834

## 2023-07-12 ENCOUNTER — HOSPITAL ENCOUNTER (OUTPATIENT)
Dept: RADIOLOGY | Facility: HOSPITAL | Age: 43
Discharge: HOME | End: 2023-07-12
Attending: PHYSICIAN ASSISTANT
Payer: COMMERCIAL

## 2023-07-12 DIAGNOSIS — M25.572 PAIN, JOINT, ANKLE, LEFT: ICD-10-CM

## 2023-07-12 PROCEDURE — 73600 X-RAY EXAM OF ANKLE: CPT | Mod: TC,FY,PO,LT

## 2023-07-12 PROCEDURE — 73600 XR ANKLE 2 VIEW LEFT: ICD-10-PCS | Mod: 26,LT,, | Performed by: RADIOLOGY

## 2023-07-12 PROCEDURE — 73600 X-RAY EXAM OF ANKLE: CPT | Mod: 26,LT,, | Performed by: RADIOLOGY

## 2023-09-03 ENCOUNTER — HOSPITAL ENCOUNTER (EMERGENCY)
Facility: HOSPITAL | Age: 43
Discharge: ANOTHER HOSPITAL | End: 2023-09-04
Attending: EMERGENCY MEDICINE
Payer: COMMERCIAL

## 2023-09-03 DIAGNOSIS — R50.9 FEVER: ICD-10-CM

## 2023-09-03 DIAGNOSIS — A41.9 SEPSIS, DUE TO UNSPECIFIED ORGANISM, UNSPECIFIED WHETHER ACUTE ORGAN DYSFUNCTION PRESENT: Primary | ICD-10-CM

## 2023-09-03 LAB
ALBUMIN SERPL BCP-MCNC: 3.7 G/DL (ref 3.5–5.2)
ALP SERPL-CCNC: 82 U/L (ref 38–126)
ALT SERPL W/O P-5'-P-CCNC: 29 U/L (ref 10–44)
ANION GAP SERPL CALC-SCNC: 15 MMOL/L (ref 8–16)
AST SERPL-CCNC: 36 U/L (ref 15–46)
BACTERIA #/AREA URNS AUTO: ABNORMAL /HPF
BASOPHILS # BLD AUTO: 0.01 K/UL (ref 0–0.2)
BASOPHILS NFR BLD: 0.1 % (ref 0–1.9)
BILIRUB SERPL-MCNC: 0.4 MG/DL (ref 0.1–1)
BILIRUB UR QL STRIP: NEGATIVE
CALCIUM SERPL-MCNC: 8.7 MG/DL (ref 8.7–10.5)
CHLORIDE SERPL-SCNC: 105 MMOL/L (ref 95–110)
CLARITY UR REFRACT.AUTO: ABNORMAL
CO2 SERPL-SCNC: 20 MMOL/L (ref 23–29)
COLOR UR AUTO: YELLOW
CREAT SERPL-MCNC: 1 MG/DL (ref 0.5–1.4)
DIFFERENTIAL METHOD: ABNORMAL
EOSINOPHIL # BLD AUTO: 0 K/UL (ref 0–0.5)
EOSINOPHIL NFR BLD: 0.1 % (ref 0–8)
ERYTHROCYTE [DISTWIDTH] IN BLOOD BY AUTOMATED COUNT: 14.6 % (ref 11.5–14.5)
EST. GFR  (NO RACE VARIABLE): >60 ML/MIN/1.73 M^2
GLUCOSE SERPL-MCNC: 98 MG/DL (ref 70–110)
GLUCOSE UR QL STRIP: NEGATIVE
HCT VFR BLD AUTO: 31.1 % (ref 37–48.5)
HGB BLD-MCNC: 10 G/DL (ref 12–16)
HGB UR QL STRIP: ABNORMAL
HYALINE CASTS UR QL AUTO: 0 /LPF
IMM GRANULOCYTES # BLD AUTO: 0.09 K/UL (ref 0–0.04)
IMM GRANULOCYTES NFR BLD AUTO: 0.7 % (ref 0–0.5)
INFLUENZA A, MOLECULAR: NEGATIVE
INFLUENZA B, MOLECULAR: NEGATIVE
KETONES UR QL STRIP: NEGATIVE
LACTATE SERPL-SCNC: 3.3 MMOL/L (ref 0.5–2.2)
LEUKOCYTE ESTERASE UR QL STRIP: ABNORMAL
LYMPHOCYTES # BLD AUTO: 0.2 K/UL (ref 1–4.8)
LYMPHOCYTES NFR BLD: 1.5 % (ref 18–48)
MCH RBC QN AUTO: 25.6 PG (ref 27–31)
MCHC RBC AUTO-ENTMCNC: 32.2 G/DL (ref 32–36)
MCV RBC AUTO: 80 FL (ref 82–98)
MICROSCOPIC COMMENT: ABNORMAL
MONOCYTES # BLD AUTO: 0.1 K/UL (ref 0.3–1)
MONOCYTES NFR BLD: 0.5 % (ref 4–15)
NEUTROPHILS # BLD AUTO: 13.4 K/UL (ref 1.8–7.7)
NEUTROPHILS NFR BLD: 97.1 % (ref 38–73)
NITRITE UR QL STRIP: NEGATIVE
NRBC BLD-RTO: 0 /100 WBC
PH UR STRIP: 6 [PH] (ref 5–8)
PLATELET # BLD AUTO: 233 K/UL (ref 150–450)
PMV BLD AUTO: 9.8 FL (ref 9.2–12.9)
POTASSIUM SERPL-SCNC: 3 MMOL/L (ref 3.5–5.1)
PROT SERPL-MCNC: 6.8 G/DL (ref 6–8.4)
PROT UR QL STRIP: ABNORMAL
RBC # BLD AUTO: 3.91 M/UL (ref 4–5.4)
RBC #/AREA URNS AUTO: 25 /HPF (ref 0–4)
SARS-COV-2 RDRP RESP QL NAA+PROBE: NEGATIVE
SODIUM SERPL-SCNC: 140 MMOL/L (ref 136–145)
SP GR UR STRIP: 1.01 (ref 1–1.03)
SPECIMEN SOURCE: NORMAL
URN SPEC COLLECT METH UR: ABNORMAL
UROBILINOGEN UR STRIP-ACNC: NEGATIVE EU/DL
UUN UR-MCNC: 5 MG/DL (ref 7–17)
WBC # BLD AUTO: 13.79 K/UL (ref 3.9–12.7)
WBC #/AREA URNS AUTO: >100 /HPF (ref 0–5)
WBC CLUMPS UR QL AUTO: ABNORMAL

## 2023-09-03 PROCEDURE — 83605 ASSAY OF LACTIC ACID: CPT | Mod: ER | Performed by: EMERGENCY MEDICINE

## 2023-09-03 PROCEDURE — 87502 INFLUENZA DNA AMP PROBE: CPT | Mod: ER | Performed by: EMERGENCY MEDICINE

## 2023-09-03 PROCEDURE — 96365 THER/PROPH/DIAG IV INF INIT: CPT | Mod: ER

## 2023-09-03 PROCEDURE — 85025 COMPLETE CBC W/AUTO DIFF WBC: CPT | Mod: ER | Performed by: EMERGENCY MEDICINE

## 2023-09-03 PROCEDURE — 94760 N-INVAS EAR/PLS OXIMETRY 1: CPT | Mod: ER

## 2023-09-03 PROCEDURE — 93010 EKG 12-LEAD: ICD-10-PCS | Mod: ,,, | Performed by: INTERNAL MEDICINE

## 2023-09-03 PROCEDURE — 87040 BLOOD CULTURE FOR BACTERIA: CPT | Mod: 59,ER | Performed by: EMERGENCY MEDICINE

## 2023-09-03 PROCEDURE — 80053 COMPREHEN METABOLIC PANEL: CPT | Mod: ER | Performed by: EMERGENCY MEDICINE

## 2023-09-03 PROCEDURE — 96361 HYDRATE IV INFUSION ADD-ON: CPT | Mod: ER

## 2023-09-03 PROCEDURE — 93005 ELECTROCARDIOGRAM TRACING: CPT | Mod: ER

## 2023-09-03 PROCEDURE — 87086 URINE CULTURE/COLONY COUNT: CPT | Mod: ER | Performed by: EMERGENCY MEDICINE

## 2023-09-03 PROCEDURE — U0002 COVID-19 LAB TEST NON-CDC: HCPCS | Mod: ER | Performed by: EMERGENCY MEDICINE

## 2023-09-03 PROCEDURE — 93010 ELECTROCARDIOGRAM REPORT: CPT | Mod: ,,, | Performed by: INTERNAL MEDICINE

## 2023-09-03 PROCEDURE — 81000 URINALYSIS NONAUTO W/SCOPE: CPT | Mod: ER | Performed by: EMERGENCY MEDICINE

## 2023-09-03 PROCEDURE — 25000003 PHARM REV CODE 250: Mod: ER | Performed by: EMERGENCY MEDICINE

## 2023-09-03 PROCEDURE — 87502 INFLUENZA DNA AMP PROBE: CPT | Mod: ER

## 2023-09-03 PROCEDURE — 63600175 PHARM REV CODE 636 W HCPCS: Mod: ER | Performed by: EMERGENCY MEDICINE

## 2023-09-03 RX ORDER — ACETAMINOPHEN 500 MG
1000 TABLET ORAL
Status: COMPLETED | OUTPATIENT
Start: 2023-09-03 | End: 2023-09-03

## 2023-09-03 RX ORDER — POTASSIUM CHLORIDE 20 MEQ/1
20 TABLET, EXTENDED RELEASE ORAL
Status: COMPLETED | OUTPATIENT
Start: 2023-09-03 | End: 2023-09-03

## 2023-09-03 RX ADMIN — PIPERACILLIN AND TAZOBACTAM 4.5 G: 4; .5 INJECTION, POWDER, LYOPHILIZED, FOR SOLUTION INTRAVENOUS; PARENTERAL at 11:09

## 2023-09-03 RX ADMIN — SODIUM CHLORIDE 1000 ML: 9 INJECTION, SOLUTION INTRAVENOUS at 11:09

## 2023-09-03 RX ADMIN — ACETAMINOPHEN 1000 MG: 500 TABLET ORAL at 09:09

## 2023-09-03 RX ADMIN — POTASSIUM CHLORIDE 20 MEQ: 1500 TABLET, EXTENDED RELEASE ORAL at 11:09

## 2023-09-03 RX ADMIN — SODIUM CHLORIDE 1000 ML: 9 INJECTION, SOLUTION INTRAVENOUS at 09:09

## 2023-09-03 NOTE — Clinical Note
"Diana Gupta" Tacho was seen and treated in our emergency department on 9/3/2023.  She may return to work on 09/05/2023.       If you have any questions or concerns, please don't hesitate to call.       RN    "

## 2023-09-04 VITALS
OXYGEN SATURATION: 99 % | HEIGHT: 65 IN | DIASTOLIC BLOOD PRESSURE: 55 MMHG | TEMPERATURE: 99 F | HEART RATE: 119 BPM | RESPIRATION RATE: 22 BRPM | SYSTOLIC BLOOD PRESSURE: 96 MMHG | BODY MASS INDEX: 29.99 KG/M2 | WEIGHT: 180 LBS

## 2023-09-04 PROCEDURE — 63600175 PHARM REV CODE 636 W HCPCS: Mod: ER | Performed by: EMERGENCY MEDICINE

## 2023-09-04 PROCEDURE — 25000003 PHARM REV CODE 250: Mod: ER | Performed by: EMERGENCY MEDICINE

## 2023-09-04 PROCEDURE — 96375 TX/PRO/DX INJ NEW DRUG ADDON: CPT | Mod: ER

## 2023-09-04 PROCEDURE — 99285 EMERGENCY DEPT VISIT HI MDM: CPT | Mod: ER,25

## 2023-09-04 RX ORDER — POTASSIUM CHLORIDE 7.45 MG/ML
10 INJECTION INTRAVENOUS
Status: COMPLETED | OUTPATIENT
Start: 2023-09-04 | End: 2023-09-04

## 2023-09-04 RX ORDER — KETOROLAC TROMETHAMINE 30 MG/ML
10 INJECTION, SOLUTION INTRAMUSCULAR; INTRAVENOUS
Status: COMPLETED | OUTPATIENT
Start: 2023-09-04 | End: 2023-09-04

## 2023-09-04 RX ADMIN — POTASSIUM CHLORIDE 10 MEQ: 7.46 INJECTION, SOLUTION INTRAVENOUS at 02:09

## 2023-09-04 RX ADMIN — KETOROLAC TROMETHAMINE 10 MG: 30 INJECTION, SOLUTION INTRAMUSCULAR; INTRAVENOUS at 01:09

## 2023-09-04 RX ADMIN — SODIUM CHLORIDE 1000 ML: 9 INJECTION, SOLUTION INTRAVENOUS at 01:09

## 2023-09-04 NOTE — ED PROVIDER NOTES
Encounter Date: 9/3/2023       History     Chief Complaint   Patient presents with    Chills    Weakness    Fever     Patient comes in today c/o fever that started earlier today. C/o mild headache and bilateral leg pain.      HPI  43 y.o.1    Day of fever frontal ha, myalgias   Has h/o nephrostomy tube  No vomiting  No specific back nor abd pain    Review of patient's allergies indicates:   Allergen Reactions    Bactrim [sulfamethoxazole-trimethoprim] Other (See Comments)     LOWER WBC     No past medical history on file.  Past Surgical History:   Procedure Laterality Date    DILATION AND CURETTAGE OF UTERUS       No family history on file.  Social History     Tobacco Use    Smoking status: Never    Smokeless tobacco: Never   Substance Use Topics    Alcohol use: No    Drug use: No     Review of Systems  All systems were reviewed/examined and were negative except as noted in the HPI.    Physical Exam     Initial Vitals [09/03/23 2119]   BP Pulse Resp Temp SpO2   98/61 (!) 151 20 (!) 102.8 °F (39.3 °C) 98 %      MAP       --         Physical Exam    General: the patient is awake, alert, and in no apparent distress.  Head: normocephalic and atraumatic, sclera are clear  OP wnl  Neck: supple without meningismus  Chest: clear to auscultation bilaterally, no respiratory distress  Heart tachy r rr  ABD soft, nontender, nondistended, no peritoneal signs  Back nt in the midline  Extremities: warm and well perfused  Skin: warm and dry  Psych conversant  Neuro: awake, alert, moving all extremities    ED Course   Procedures  Labs Reviewed   COMPREHENSIVE METABOLIC PANEL - Abnormal; Notable for the following components:       Result Value    Potassium 3.0 (*)     CO2 20 (*)     BUN 5 (*)     All other components within normal limits   LACTIC ACID, PLASMA - Abnormal; Notable for the following components:    Lactate (Lactic Acid) 3.3 (*)     All other components within normal limits   URINALYSIS, REFLEX TO URINE CULTURE -  Abnormal; Notable for the following components:    Appearance, UA Cloudy (*)     Protein, UA 2+ (*)     Occult Blood UA 2+ (*)     Leukocytes, UA 3+ (*)     All other components within normal limits    Narrative:     Preferred Collection Type->Urine, Clean Catch  Specimen Source->Urine   CBC W/ AUTO DIFFERENTIAL - Abnormal; Notable for the following components:    WBC 13.79 (*)     RBC 3.91 (*)     Hemoglobin 10.0 (*)     Hematocrit 31.1 (*)     MCV 80 (*)     MCH 25.6 (*)     RDW 14.6 (*)     Immature Granulocytes 0.7 (*)     Gran # (ANC) 13.4 (*)     Immature Grans (Abs) 0.09 (*)     Lymph # 0.2 (*)     Mono # 0.1 (*)     Gran % 97.1 (*)     Lymph % 1.5 (*)     Mono % 0.5 (*)     All other components within normal limits   URINALYSIS MICROSCOPIC - Abnormal; Notable for the following components:    RBC, UA 25 (*)     WBC, UA >100 (*)     WBC Clumps, UA Many (*)     Bacteria Few (*)     All other components within normal limits    Narrative:     Preferred Collection Type->Urine, Clean Catch  Specimen Source->Urine   INFLUENZA A & B BY MOLECULAR   CULTURE, BLOOD   CULTURE, BLOOD   CULTURE, URINE   SARS-COV-2 RNA AMPLIFICATION, QUAL    Narrative:     Is the patient symptomatic?->Yes   PREGNANCY TEST, URINE RAPID   POCT URINE PREGNANCY          Imaging Results              X-Ray Chest AP Portable (Final result)  Result time 09/03/23 22:20:19      Final result by Rod Dejesus MD (09/03/23 22:20:19)                   Impression:      No acute abnormality.      Electronically signed by: Rod Dejesus  Date:    09/03/2023  Time:    22:20               Narrative:    EXAMINATION:  XR CHEST AP PORTABLE    CLINICAL HISTORY:  fever;    TECHNIQUE:  Single frontal view of the chest was performed.    COMPARISON:  None    FINDINGS:  The lungs are clear, with normal appearance of pulmonary vasculature and no pleural effusion or pneumothorax.    The cardiac silhouette is normal in size. The hilar and mediastinal contours are  unremarkable.    Bones are intact.                        Final result by Rod Dejesus MD (09/03/23 22:20:19)                   Impression:      No acute abnormality.      Electronically signed by: Rod Dejesus  Date:    09/03/2023  Time:    22:20               Narrative:    EXAMINATION:  XR CHEST AP PORTABLE    CLINICAL HISTORY:  fever;    TECHNIQUE:  Single frontal view of the chest was performed.    COMPARISON:  None    FINDINGS:  The lungs are clear, with normal appearance of pulmonary vasculature and no pleural effusion or pneumothorax.    The cardiac silhouette is normal in size. The hilar and mediastinal contours are unremarkable.    Bones are intact.                        Final result by Rod Dejesus MD (09/03/23 22:20:19)                   Impression:      No acute abnormality.      Electronically signed by: Rod Dejesus  Date:    09/03/2023  Time:    22:20               Narrative:    EXAMINATION:  XR CHEST AP PORTABLE    CLINICAL HISTORY:  fever;    TECHNIQUE:  Single frontal view of the chest was performed.    COMPARISON:  None    FINDINGS:  The lungs are clear, with normal appearance of pulmonary vasculature and no pleural effusion or pneumothorax.    The cardiac silhouette is normal in size. The hilar and mediastinal contours are unremarkable.    Bones are intact.                        Final result by Rod Dejesus MD (09/03/23 22:20:19)                   Impression:      No acute abnormality.      Electronically signed by: Rod Dejesus  Date:    09/03/2023  Time:    22:20               Narrative:    EXAMINATION:  XR CHEST AP PORTABLE    CLINICAL HISTORY:  fever;    TECHNIQUE:  Single frontal view of the chest was performed.    COMPARISON:  None    FINDINGS:  The lungs are clear, with normal appearance of pulmonary vasculature and no pleural effusion or pneumothorax.    The cardiac silhouette is normal in size. The hilar and mediastinal contours are unremarkable.    Bones are intact.                                        Medications   acetaminophen tablet 1,000 mg (1,000 mg Oral Given 9/3/23 2149)   sodium chloride 0.9% bolus 1,000 mL 1,000 mL (0 mLs Intravenous Stopped 9/3/23 2245)   sodium chloride 0.9% bolus 1,000 mL 1,000 mL (0 mLs Intravenous Stopped 9/4/23 0022)   piperacillin-tazobactam (ZOSYN) 4.5 g in dextrose 5 % in water (D5W) 100 mL IVPB (MB+) (0 g Intravenous Stopped 9/3/23 2352)   potassium chloride SA CR tablet 20 mEq (20 mEq Oral Given 9/3/23 2323)   sodium chloride 0.9% bolus 1,000 mL 1,000 mL (1,000 mLs Intravenous New Bag 9/4/23 0157)   ketorolac injection 9.999 mg (9.999 mg Intravenous Given 9/4/23 0157)   potassium chloride 10 mEq in 100 mL IVPB (10 mEq Intravenous New Bag 9/4/23 0228)     Medical Decision Making  Amount and/or Complexity of Data Reviewed  Labs: ordered.  Radiology: ordered.    Risk  OTC drugs.  Prescription drug management.       Medical Decision Making:    This is an emergent evaluation of a patient presenting to the ED.  Nursing notes were reviewed.  I personally reviewed, read, and interpreted the ECG and any monitoring strips.  Sinus tach, no sig change from prior  I reviewed radiology images personally along with interpretations.  Cxr neg  I personally reviewed and interpreted the laboratory results.  I decided to obtain and review old medical records, which showed: well care    Bret Baumann MD, JOHN              Critical Care Time    Critical care time was provided for 30 minutes exclusive of other billable procedures and teaching time for the treatment of  concern for sepsis   where the potential for death, shock, or further decline was possible.  Critical care time can include documentation, discussion with consultants, developing a care plan, as well as direct patient care.    Harvey Baumann MD      This patient does have evidence of infective focus  My overall impression is sepsis.  Source: Urinary Tract  Antibiotics given-   Antibiotics (72h ago,  onward)      None          Latest lactate reviewed-  Initial lactate >3  Organ dysfunction indicated by  elev lact    Fluid challenge Actual Body weight- Patient will receive 30ml/kg actual body weight to calculate fluid bolus for treatment of septic shock.     Post- resuscitation assessment Yes Perfusion exam was performed within 6 hours of septic shock presentation after bolus shows Adequate tissue perfusion assessed by non-invasive monitoring       Will Not start Pressors- Levophed for MAP of 65  Source control achieved by: IV antibiotics            Clinical Impression:   Final diagnoses:  [R50.9] Fever  [A41.9] Sepsis, due to unspecified organism, unspecified whether acute organ dysfunction present (Primary)        ED Disposition Condition    Transfer to Another Facility Stable             Bret Baumann MD, JOHN, FACEP  Department of Emergency Medicine       Harvey Baumann MD  09/04/23 4994       Harvey Baumann MD  09/21/23 7013

## 2023-09-05 LAB
BACTERIA UR CULT: NORMAL
BACTERIA UR CULT: NORMAL

## 2023-09-09 LAB
BACTERIA BLD CULT: NORMAL
BACTERIA BLD CULT: NORMAL

## 2024-01-20 ENCOUNTER — HOSPITAL ENCOUNTER (EMERGENCY)
Facility: HOSPITAL | Age: 44
Discharge: ANOTHER HOSPITAL | End: 2024-01-21
Attending: EMERGENCY MEDICINE
Payer: COMMERCIAL

## 2024-01-20 DIAGNOSIS — R31.9 URINARY TRACT INFECTION WITH HEMATURIA, SITE UNSPECIFIED: Primary | ICD-10-CM

## 2024-01-20 DIAGNOSIS — N39.0 URINARY TRACT INFECTION WITH HEMATURIA, SITE UNSPECIFIED: Primary | ICD-10-CM

## 2024-01-20 DIAGNOSIS — R50.9 FEVER: ICD-10-CM

## 2024-01-20 DIAGNOSIS — T83.9XXA: ICD-10-CM

## 2024-01-20 LAB
ALBUMIN SERPL BCP-MCNC: 4.1 G/DL (ref 3.5–5.2)
ALP SERPL-CCNC: 76 U/L (ref 38–126)
ALT SERPL W/O P-5'-P-CCNC: 16 U/L (ref 10–44)
ANION GAP SERPL CALC-SCNC: 10 MMOL/L (ref 8–16)
AST SERPL-CCNC: 22 U/L (ref 15–46)
B-HCG UR QL: NEGATIVE
BACTERIA #/AREA URNS AUTO: ABNORMAL /HPF
BASOPHILS # BLD AUTO: 0.02 K/UL (ref 0–0.2)
BASOPHILS NFR BLD: 0.2 % (ref 0–1.9)
BILIRUB SERPL-MCNC: 0.7 MG/DL (ref 0.1–1)
BILIRUB UR QL STRIP: NEGATIVE
CALCIUM SERPL-MCNC: 8.8 MG/DL (ref 8.7–10.5)
CHLORIDE SERPL-SCNC: 100 MMOL/L (ref 95–110)
CLARITY UR REFRACT.AUTO: ABNORMAL
CO2 SERPL-SCNC: 25 MMOL/L (ref 23–29)
COLOR UR AUTO: YELLOW
CREAT SERPL-MCNC: 0.85 MG/DL (ref 0.5–1.4)
CTP QC/QA: YES
DIFFERENTIAL METHOD BLD: ABNORMAL
EOSINOPHIL # BLD AUTO: 0 K/UL (ref 0–0.5)
EOSINOPHIL NFR BLD: 0 % (ref 0–8)
ERYTHROCYTE [DISTWIDTH] IN BLOOD BY AUTOMATED COUNT: 15 % (ref 11.5–14.5)
EST. GFR  (NO RACE VARIABLE): >60 ML/MIN/1.73 M^2
GLUCOSE SERPL-MCNC: 115 MG/DL (ref 70–110)
GLUCOSE UR QL STRIP: NEGATIVE
HCT VFR BLD AUTO: 36.6 % (ref 37–48.5)
HGB BLD-MCNC: 11.8 G/DL (ref 12–16)
HGB UR QL STRIP: ABNORMAL
HYALINE CASTS UR QL AUTO: 0 /LPF
IMM GRANULOCYTES # BLD AUTO: 0.03 K/UL (ref 0–0.04)
IMM GRANULOCYTES NFR BLD AUTO: 0.2 % (ref 0–0.5)
INFLUENZA A, MOLECULAR: NEGATIVE
INFLUENZA B, MOLECULAR: NEGATIVE
KETONES UR QL STRIP: NEGATIVE
LACTATE SERPL-SCNC: 1.1 MMOL/L (ref 0.5–2.2)
LEUKOCYTE ESTERASE UR QL STRIP: ABNORMAL
LYMPHOCYTES # BLD AUTO: 1.4 K/UL (ref 1–4.8)
LYMPHOCYTES NFR BLD: 11.3 % (ref 18–48)
MCH RBC QN AUTO: 25.3 PG (ref 27–31)
MCHC RBC AUTO-ENTMCNC: 32.2 G/DL (ref 32–36)
MCV RBC AUTO: 79 FL (ref 82–98)
MICROSCOPIC COMMENT: ABNORMAL
MONOCYTES # BLD AUTO: 1 K/UL (ref 0.3–1)
MONOCYTES NFR BLD: 7.9 % (ref 4–15)
NEUTROPHILS # BLD AUTO: 10.2 K/UL (ref 1.8–7.7)
NEUTROPHILS NFR BLD: 80.4 % (ref 38–73)
NITRITE UR QL STRIP: POSITIVE
NRBC BLD-RTO: 0 /100 WBC
PH UR STRIP: 7 [PH] (ref 5–8)
PLATELET # BLD AUTO: 301 K/UL (ref 150–450)
PMV BLD AUTO: 10.9 FL (ref 9.2–12.9)
POTASSIUM SERPL-SCNC: 3.5 MMOL/L (ref 3.5–5.1)
PROT SERPL-MCNC: 7.5 G/DL (ref 6–8.4)
PROT UR QL STRIP: ABNORMAL
RBC # BLD AUTO: 4.66 M/UL (ref 4–5.4)
RBC #/AREA URNS AUTO: 10 /HPF (ref 0–4)
SARS-COV-2 RDRP RESP QL NAA+PROBE: NEGATIVE
SODIUM SERPL-SCNC: 135 MMOL/L (ref 136–145)
SP GR UR STRIP: 1.02 (ref 1–1.03)
SPECIMEN SOURCE: NORMAL
URN SPEC COLLECT METH UR: ABNORMAL
UROBILINOGEN UR STRIP-ACNC: NEGATIVE EU/DL
UUN UR-MCNC: 9 MG/DL (ref 7–17)
WBC # BLD AUTO: 12.61 K/UL (ref 3.9–12.7)
WBC #/AREA URNS AUTO: >100 /HPF (ref 0–5)
WBC CLUMPS UR QL AUTO: ABNORMAL

## 2024-01-20 PROCEDURE — 96365 THER/PROPH/DIAG IV INF INIT: CPT | Mod: ER

## 2024-01-20 PROCEDURE — 94760 N-INVAS EAR/PLS OXIMETRY 1: CPT | Mod: ER

## 2024-01-20 PROCEDURE — 81000 URINALYSIS NONAUTO W/SCOPE: CPT | Mod: ER | Performed by: EMERGENCY MEDICINE

## 2024-01-20 PROCEDURE — 80053 COMPREHEN METABOLIC PANEL: CPT | Mod: ER | Performed by: EMERGENCY MEDICINE

## 2024-01-20 PROCEDURE — 93010 ELECTROCARDIOGRAM REPORT: CPT | Mod: ,,, | Performed by: INTERNAL MEDICINE

## 2024-01-20 PROCEDURE — 99285 EMERGENCY DEPT VISIT HI MDM: CPT | Mod: 25,ER

## 2024-01-20 PROCEDURE — 63600175 PHARM REV CODE 636 W HCPCS: Mod: ER | Performed by: EMERGENCY MEDICINE

## 2024-01-20 PROCEDURE — 93005 ELECTROCARDIOGRAM TRACING: CPT | Mod: ER

## 2024-01-20 PROCEDURE — 25000003 PHARM REV CODE 250: Mod: ER | Performed by: EMERGENCY MEDICINE

## 2024-01-20 PROCEDURE — 96361 HYDRATE IV INFUSION ADD-ON: CPT | Mod: ER

## 2024-01-20 PROCEDURE — 81025 URINE PREGNANCY TEST: CPT | Mod: ER | Performed by: EMERGENCY MEDICINE

## 2024-01-20 PROCEDURE — U0002 COVID-19 LAB TEST NON-CDC: HCPCS | Mod: ER | Performed by: EMERGENCY MEDICINE

## 2024-01-20 PROCEDURE — 85025 COMPLETE CBC W/AUTO DIFF WBC: CPT | Mod: ER | Performed by: EMERGENCY MEDICINE

## 2024-01-20 PROCEDURE — 83605 ASSAY OF LACTIC ACID: CPT | Mod: ER | Performed by: EMERGENCY MEDICINE

## 2024-01-20 PROCEDURE — 87502 INFLUENZA DNA AMP PROBE: CPT | Mod: ER | Performed by: EMERGENCY MEDICINE

## 2024-01-20 PROCEDURE — 87040 BLOOD CULTURE FOR BACTERIA: CPT | Mod: 59,ER | Performed by: EMERGENCY MEDICINE

## 2024-01-20 PROCEDURE — 87086 URINE CULTURE/COLONY COUNT: CPT | Mod: ER | Performed by: EMERGENCY MEDICINE

## 2024-01-20 RX ORDER — KETOROLAC TROMETHAMINE 30 MG/ML
15 INJECTION, SOLUTION INTRAMUSCULAR; INTRAVENOUS
Status: COMPLETED | OUTPATIENT
Start: 2024-01-21 | End: 2024-01-21

## 2024-01-20 RX ORDER — ACETAMINOPHEN 325 MG/1
650 TABLET ORAL
Status: COMPLETED | OUTPATIENT
Start: 2024-01-21 | End: 2024-01-20

## 2024-01-20 RX ADMIN — CEFTRIAXONE SODIUM 1 G: 1 INJECTION, POWDER, FOR SOLUTION INTRAMUSCULAR; INTRAVENOUS at 11:01

## 2024-01-20 RX ADMIN — SODIUM CHLORIDE 1000 ML: 9 INJECTION, SOLUTION INTRAVENOUS at 10:01

## 2024-01-20 RX ADMIN — ACETAMINOPHEN 650 MG: 325 TABLET ORAL at 11:01

## 2024-01-21 VITALS
RESPIRATION RATE: 22 BRPM | WEIGHT: 172.69 LBS | BODY MASS INDEX: 28.77 KG/M2 | OXYGEN SATURATION: 100 % | TEMPERATURE: 98 F | HEIGHT: 65 IN | SYSTOLIC BLOOD PRESSURE: 108 MMHG | DIASTOLIC BLOOD PRESSURE: 62 MMHG | HEART RATE: 97 BPM

## 2024-01-21 PROCEDURE — 96375 TX/PRO/DX INJ NEW DRUG ADDON: CPT | Mod: ER

## 2024-01-21 PROCEDURE — 25000003 PHARM REV CODE 250: Mod: ER | Performed by: EMERGENCY MEDICINE

## 2024-01-21 PROCEDURE — 63600175 PHARM REV CODE 636 W HCPCS: Mod: ER | Performed by: EMERGENCY MEDICINE

## 2024-01-21 PROCEDURE — 96361 HYDRATE IV INFUSION ADD-ON: CPT | Mod: ER

## 2024-01-21 RX ADMIN — SODIUM CHLORIDE 1000 ML: 9 INJECTION, SOLUTION INTRAVENOUS at 12:01

## 2024-01-21 RX ADMIN — KETOROLAC TROMETHAMINE 15 MG: 30 INJECTION INTRAMUSCULAR; INTRAVENOUS at 02:01

## 2024-01-21 NOTE — ED PROVIDER NOTES
Encounter Date: 1/20/2024       History     Chief Complaint   Patient presents with    Flank Pain     Pt to the ER with right flank pain started today and fever 102 at home. Pt took Tylenol 1930. Pt post-op stent placement and kidney sx from stone. Pt to have stent removed on Tuesday.      HPI  43 y.o.   Pt with hx of left sided urinary obstruction, s/p intervention, stenting at [facility]; due to have stent removed on Tuesday   Notes RIGHT sided flank pain, fever along with cough x couple of days    Review of patient's allergies indicates:   Allergen Reactions    Bactrim [sulfamethoxazole-trimethoprim] Other (See Comments)     LOWER WBC     No past medical history on file.  Past Surgical History:   Procedure Laterality Date    DILATION AND CURETTAGE OF UTERUS       No family history on file.  Social History     Tobacco Use    Smoking status: Never    Smokeless tobacco: Never   Substance Use Topics    Alcohol use: No    Drug use: No     Review of Systems  All systems were reviewed/examined and were negative except as noted in the HPI.    Physical Exam     Initial Vitals [01/20/24 2206]   BP Pulse Resp Temp SpO2   117/67 (!) 133 18 (!) 101.8 °F (38.8 °C) 96 %      MAP       --         Physical Exam    General: the patient is awake, alert, and in no apparent distress.  Head: normocephalic and atraumatic, sclera are clear  Neck: supple without meningismus  Chest: clear to auscultation bilaterally, no respiratory distress  Heart: tachy r rr  ABD soft, nontender, nondistended, no peritoneal signs  Back nt in the midline  Extremities: warm and well perfused  Skin: warm and dry  Psych conversant  Neuro: awake, alert, moving all extremities    ED Course   Procedures  Labs Reviewed   CBC W/ AUTO DIFFERENTIAL - Abnormal; Notable for the following components:       Result Value    Hemoglobin 11.8 (*)     Hematocrit 36.6 (*)     MCV 79 (*)     MCH 25.3 (*)     RDW 15.0 (*)     Gran # (ANC) 10.2 (*)     Gran % 80.4 (*)     Lymph % 11.3  (*)     All other components within normal limits   COMPREHENSIVE METABOLIC PANEL - Abnormal; Notable for the following components:    Sodium 135 (*)     Glucose 115 (*)     All other components within normal limits   URINALYSIS, REFLEX TO URINE CULTURE - Abnormal; Notable for the following components:    Appearance, UA Hazy (*)     Protein, UA 2+ (*)     Occult Blood UA 3+ (*)     Nitrite, UA Positive (*)     Leukocytes, UA 1+ (*)     All other components within normal limits    Narrative:     Preferred Collection Type->Urine, Clean Catch  Specimen Source->Urine   URINALYSIS MICROSCOPIC - Abnormal; Notable for the following components:    RBC, UA 10 (*)     WBC, UA >100 (*)     WBC Clumps, UA Few (*)     Bacteria Moderate (*)     All other components within normal limits    Narrative:     Preferred Collection Type->Urine, Clean Catch  Specimen Source->Urine   INFLUENZA A & B BY MOLECULAR   CULTURE, BLOOD   CULTURE, BLOOD   CULTURE, URINE   LACTIC ACID, PLASMA   SARS-COV-2 RNA AMPLIFICATION, QUAL    Narrative:     Is the patient symptomatic?->Yes   POCT URINE PREGNANCY          Imaging Results              CT Renal Stone Study ABD Pelvis WO (Final result)  Result time 01/20/24 23:42:22      Final result by Rod Dejesus MD (01/20/24 23:42:22)                   Impression:      Fat containing umbilical hernia.    Left urinary stent with pigtails in the left renal pelvis and bladder.  Distended ureter and mild left-sided hydronephrosis.    No evidence for right-sided hydronephrosis or urinary stone.    Hepatomegaly    All CT scans   are performed using dose optimization techniques including the following: automated exposure control; adjustment of the mA and/or kV; use of iterative reconstruction technique.  Dose modulation was employed for ALARA by means of: Automated exposure control; adjustment of the mA and/or kV according to patient size (this includes techniques or standardized protocols for targeted exams  where dose is matched to indication/reason for exam; i.e. extremities or head); and/or use of iterative reconstructive technique.      Electronically signed by: Rod Dejesus  Date:    01/20/2024  Time:    23:42               Narrative:    EXAMINATION:  CT RENAL STONE STUDY ABD PELVIS WO    CLINICAL HISTORY:  Flank pain, kidney stone suspected;known L sided stent, co R sided flank pain;    TECHNIQUE:  Low dose axial images, sagittal and coronal reformations were obtained from the lung bases to the pubic symphysis.  Contrast was not administered.    COMPARISON:  None    FINDINGS:  Heart: Normal in size. No pericardial effusion.    Lung Bases: Well aerated, without consolidation or pleural fluid.    Liver: Hepatomegaly with fatty infiltration liver.  With no focal hepatic lesions.    Gallbladder: No calcified gallstones.    Bile Ducts: No evidence of dilated ducts.    Pancreas: No mass or peripancreatic fat stranding.    Spleen: Unremarkable.    Adrenals: Unremarkable.    Kidneys/ Ureters: Left-sided ureteral stent is identified with pigtails in the left renal pelvis and left bladder..  Ureteral stent.  Distended left ureter and left renal collecting system.  No urinary stones identified.    Bladder: No evidence of wall thickening.    Reproductive organs: Unremarkable.    GI Tract/Mesentery: No evidence of bowel obstruction or inflammation.  Appendix is unremarkable.    Peritoneal Space: No ascites. No free air.    Retroperitoneum: No significant adenopathy.    Abdominal wall: Fat containing umbilical hernia.    Vasculature: No  aneurysm.    Bones: No acute fracture.                                       X-Ray Chest AP Portable (Final result)  Result time 01/20/24 23:35:19      Final result by Rod Dejesus MD (01/20/24 23:35:19)                   Impression:      No acute abnormality.      Electronically signed by: Rod Dejesus  Date:    01/20/2024  Time:    23:35               Narrative:    EXAMINATION:  XR CHEST  AP PORTABLE    CLINICAL HISTORY:  fever;    TECHNIQUE:  Single frontal view of the chest was performed.    COMPARISON:  None    FINDINGS:  The lungs are clear, with normal appearance of pulmonary vasculature and no pleural effusion or pneumothorax.    The cardiac silhouette is normal in size. The hilar and mediastinal contours are unremarkable.    Bones are intact.                                       Medications   sodium chloride 0.9% bolus 1,000 mL 1,000 mL (0 mLs Intravenous Stopped 1/21/24 0035)   sodium chloride 0.9% bolus 1,000 mL 1,000 mL (0 mLs Intravenous Stopped 1/21/24 0141)   cefTRIAXone (Rocephin) 1 g in dextrose 5 % in water (D5W) 100 mL IVPB (MB+) (0 g Intravenous Stopped 1/21/24 0012)   ketorolac injection 15 mg (15 mg Intravenous Given 1/21/24 0210)   acetaminophen tablet 650 mg (650 mg Oral Given 1/20/24 2356)     Medical Decision Making  Amount and/or Complexity of Data Reviewed  Labs: ordered.  Radiology: ordered.    Risk  OTC drugs.  Prescription drug management.       Medical Decision Making:    This is an emergent evaluation of a patient presenting to the ED.  Nursing notes were reviewed.  I personally reviewed, read, and interpreted the ECG and any monitoring strips.  ECG: unchanged from previous tracings, sinus tachycardia Compared with prior if available.  Read and interpreted by me independently.      I reviewed radiology images personally along with interpretations.  Cxr nad  CT abd pelvis L stent in place, no obvious R obstruction  Lactate ok  Wbc ok  +bacturia  I personally reviewed and interpreted the laboratory results.  Communicated with another physician regarding patient's care: [facility]/hospitalist at [facility], agrees to take in transfer, need for IV abx  I decided to obtain and review old medical records, which showed: prior sepsis    Critical Care Time    Critical care time was provided for 30 minutes exclusive of other billable procedures and teaching time for the treatment of  concern  "for sepsis   where the potential for death, shock, or further decline was possible.  Critical care time can include documentation, discussion with consultants, developing a care plan, as well as direct patient care.    Harvey Baumann MD    /62   Pulse 101   Temp 99.5 °F (37.5 °C) (Oral)   Resp 20   Ht 5' 5" (1.651 m)   Wt 78.3 kg (172 lb 11 oz)   LMP 12/26/2023   SpO2 96%   Breastfeeding No   BMI 28.74 kg/m²                                  Clinical Impression:  Final diagnoses:  [R50.9] Fever  [N39.0, R31.9] Urinary tract infection with hematuria, site unspecified (Primary)  [T83.9XXA] Complication of urinary stent, initial encounter          ED Disposition Condition    Transfer to Another Facility Stable             Bret Baumann MD, JOHN, FACEP  Department of Emergency Medicine       Harvey Baumann MD  01/21/24 0341    "

## 2024-01-22 LAB
BACTERIA UR CULT: NORMAL
BACTERIA UR CULT: NORMAL

## 2024-01-26 LAB
BACTERIA BLD CULT: NORMAL
BACTERIA BLD CULT: NORMAL

## 2024-06-21 ENCOUNTER — HOSPITAL ENCOUNTER (EMERGENCY)
Facility: HOSPITAL | Age: 44
Discharge: HOME | End: 2024-06-21
Attending: EMERGENCY MEDICINE
Payer: COMMERCIAL

## 2024-06-21 VITALS
SYSTOLIC BLOOD PRESSURE: 123 MMHG | RESPIRATION RATE: 17 BRPM | OXYGEN SATURATION: 98 % | TEMPERATURE: 100 F | HEIGHT: 66 IN | HEART RATE: 91 BPM | DIASTOLIC BLOOD PRESSURE: 76 MMHG | BODY MASS INDEX: 26.93 KG/M2 | WEIGHT: 167.56 LBS

## 2024-06-21 DIAGNOSIS — N12 PYELONEPHRITIS: Primary | ICD-10-CM

## 2024-06-21 LAB
ALBUMIN SERPL BCP-MCNC: 4.9 G/DL (ref 3.5–5.2)
ALP SERPL-CCNC: 92 U/L (ref 38–126)
ALT SERPL W/O P-5'-P-CCNC: 17 U/L (ref 10–44)
ANION GAP SERPL CALC-SCNC: 14 MMOL/L (ref 8–16)
ANISOCYTOSIS BLD QL SMEAR: SLIGHT
AST SERPL-CCNC: 25 U/L (ref 15–46)
B-HCG UR QL: NEGATIVE
BACTERIA #/AREA URNS AUTO: ABNORMAL /HPF
BASOPHILS # BLD AUTO: 0.01 K/UL (ref 0–0.2)
BASOPHILS NFR BLD: 0.1 % (ref 0–1.9)
BILIRUB SERPL-MCNC: 0.9 MG/DL (ref 0.1–1)
BILIRUB UR QL STRIP: NEGATIVE
CALCIUM SERPL-MCNC: 9.8 MG/DL (ref 8.7–10.5)
CHLORIDE SERPL-SCNC: 104 MMOL/L (ref 95–110)
CLARITY UR REFRACT.AUTO: ABNORMAL
CO2 SERPL-SCNC: 23 MMOL/L (ref 23–29)
COLOR UR AUTO: YELLOW
CREAT SERPL-MCNC: 0.83 MG/DL (ref 0.5–1.4)
CTP QC/QA: YES
DACRYOCYTES BLD QL SMEAR: ABNORMAL
DIFFERENTIAL METHOD BLD: ABNORMAL
EOSINOPHIL # BLD AUTO: 0 K/UL (ref 0–0.5)
EOSINOPHIL NFR BLD: 0.2 % (ref 0–8)
ERYTHROCYTE [DISTWIDTH] IN BLOOD BY AUTOMATED COUNT: 14.6 % (ref 11.5–14.5)
EST. GFR  (NO RACE VARIABLE): >60 ML/MIN/1.73 M^2
GIANT PLATELETS BLD QL SMEAR: PRESENT
GLUCOSE SERPL-MCNC: 88 MG/DL (ref 70–110)
GLUCOSE UR QL STRIP: NEGATIVE
HCT VFR BLD AUTO: 43.7 % (ref 37–48.5)
HGB BLD-MCNC: 13.7 G/DL (ref 12–16)
HGB UR QL STRIP: ABNORMAL
HYALINE CASTS UR QL AUTO: 0 /LPF
HYPOCHROMIA BLD QL SMEAR: ABNORMAL
IMM GRANULOCYTES # BLD AUTO: 0.02 K/UL (ref 0–0.04)
IMM GRANULOCYTES NFR BLD AUTO: 0.2 % (ref 0–0.5)
KETONES UR QL STRIP: NEGATIVE
LEUKOCYTE ESTERASE UR QL STRIP: ABNORMAL
LIPASE SERPL-CCNC: 40 U/L (ref 23–300)
LYMPHOCYTES # BLD AUTO: 1.2 K/UL (ref 1–4.8)
LYMPHOCYTES NFR BLD: 10.3 % (ref 18–48)
MCH RBC QN AUTO: 24.6 PG (ref 27–31)
MCHC RBC AUTO-ENTMCNC: 31.4 G/DL (ref 32–36)
MCV RBC AUTO: 78 FL (ref 82–98)
MICROSCOPIC COMMENT: ABNORMAL
MONOCYTES # BLD AUTO: 0.9 K/UL (ref 0.3–1)
MONOCYTES NFR BLD: 7.7 % (ref 4–15)
NEUTROPHILS # BLD AUTO: 9.4 K/UL (ref 1.8–7.7)
NEUTROPHILS NFR BLD: 81.5 % (ref 38–73)
NITRITE UR QL STRIP: POSITIVE
NRBC BLD-RTO: 0 /100 WBC
OVALOCYTES BLD QL SMEAR: ABNORMAL
PH UR STRIP: 6 [PH] (ref 5–8)
PLATELET # BLD AUTO: 287 K/UL (ref 150–450)
PLATELET BLD QL SMEAR: ABNORMAL
PMV BLD AUTO: 10.4 FL (ref 9.2–12.9)
POTASSIUM SERPL-SCNC: 3.9 MMOL/L (ref 3.5–5.1)
PROT SERPL-MCNC: 9 G/DL (ref 6–8.4)
PROT UR QL STRIP: ABNORMAL
RBC # BLD AUTO: 5.58 M/UL (ref 4–5.4)
RBC #/AREA URNS AUTO: 20 /HPF (ref 0–4)
SODIUM SERPL-SCNC: 141 MMOL/L (ref 136–145)
SP GR UR STRIP: 1.02 (ref 1–1.03)
URN SPEC COLLECT METH UR: ABNORMAL
UROBILINOGEN UR STRIP-ACNC: NEGATIVE EU/DL
UUN UR-MCNC: 8 MG/DL (ref 7–17)
WBC # BLD AUTO: 11.56 K/UL (ref 3.9–12.7)
WBC #/AREA URNS AUTO: >100 /HPF (ref 0–5)

## 2024-06-21 PROCEDURE — 99285 EMERGENCY DEPT VISIT HI MDM: CPT | Mod: 25,ER

## 2024-06-21 PROCEDURE — 25000003 PHARM REV CODE 250: Mod: ER | Performed by: PHYSICIAN ASSISTANT

## 2024-06-21 PROCEDURE — 81025 URINE PREGNANCY TEST: CPT | Mod: ER | Performed by: PHYSICIAN ASSISTANT

## 2024-06-21 PROCEDURE — 83690 ASSAY OF LIPASE: CPT | Mod: ER | Performed by: PHYSICIAN ASSISTANT

## 2024-06-21 PROCEDURE — 80053 COMPREHEN METABOLIC PANEL: CPT | Mod: ER | Performed by: PHYSICIAN ASSISTANT

## 2024-06-21 PROCEDURE — 96361 HYDRATE IV INFUSION ADD-ON: CPT | Mod: ER

## 2024-06-21 PROCEDURE — 63600175 PHARM REV CODE 636 W HCPCS: Mod: ER | Performed by: PHYSICIAN ASSISTANT

## 2024-06-21 PROCEDURE — 87086 URINE CULTURE/COLONY COUNT: CPT | Mod: ER | Performed by: PHYSICIAN ASSISTANT

## 2024-06-21 PROCEDURE — 81000 URINALYSIS NONAUTO W/SCOPE: CPT | Mod: ER | Performed by: PHYSICIAN ASSISTANT

## 2024-06-21 PROCEDURE — 85025 COMPLETE CBC W/AUTO DIFF WBC: CPT | Mod: ER | Performed by: PHYSICIAN ASSISTANT

## 2024-06-21 PROCEDURE — 96365 THER/PROPH/DIAG IV INF INIT: CPT | Mod: ER

## 2024-06-21 PROCEDURE — 96375 TX/PRO/DX INJ NEW DRUG ADDON: CPT | Mod: ER

## 2024-06-21 RX ORDER — AMOXICILLIN AND CLAVULANATE POTASSIUM 875; 125 MG/1; MG/1
1 TABLET, FILM COATED ORAL 2 TIMES DAILY
Qty: 14 TABLET | Refills: 0 | Status: SHIPPED | OUTPATIENT
Start: 2024-06-21

## 2024-06-21 RX ORDER — KETOROLAC TROMETHAMINE 30 MG/ML
15 INJECTION, SOLUTION INTRAMUSCULAR; INTRAVENOUS
Status: COMPLETED | OUTPATIENT
Start: 2024-06-21 | End: 2024-06-21

## 2024-06-21 RX ADMIN — KETOROLAC TROMETHAMINE 15 MG: 30 INJECTION, SOLUTION INTRAMUSCULAR at 12:06

## 2024-06-21 RX ADMIN — CEFTRIAXONE SODIUM 1 G: 1 INJECTION, POWDER, FOR SOLUTION INTRAMUSCULAR; INTRAVENOUS at 01:06

## 2024-06-21 RX ADMIN — SODIUM CHLORIDE 1000 ML: 9 INJECTION, SOLUTION INTRAVENOUS at 12:06

## 2024-06-21 NOTE — ED PROVIDER NOTES
Encounter Date: 2024       History     Chief Complaint   Patient presents with    Flank Pain     Right flank pain that started yesterday. Denies NVD or urinary symptoms     43-year-old female presents to ED with concern of right-sided mid back and right-sided flank pain that began yesterday.  She has had associated fever with temperature at home 100.5.  Associated symptoms are chills and body aches.  No nausea or vomiting.  Denying dysuria and hematuria but has noticed increase in urine frequency.  Denying abdominal pain.  No chest pain, cough, nasal congestion, sore throat, ear pain, bowel complaints.  Last BM yesterday described as normal.  Previous abdominal surgeries include  section x2.  No other acute complaints at this time    The history is provided by the patient.     Review of patient's allergies indicates:   Allergen Reactions    Bactrim [sulfamethoxazole-trimethoprim] Other (See Comments)     LOWER WBC     History reviewed. No pertinent past medical history.  Past Surgical History:   Procedure Laterality Date    DILATION AND CURETTAGE OF UTERUS       No family history on file.  Social History     Tobacco Use    Smoking status: Never    Smokeless tobacco: Never   Substance Use Topics    Alcohol use: No    Drug use: No     Review of Systems   Constitutional:  Positive for chills and fever.   HENT:  Negative for congestion and sore throat.    Respiratory:  Negative for cough and shortness of breath.    Cardiovascular:  Negative for chest pain.   Gastrointestinal:  Negative for abdominal pain, constipation, diarrhea, nausea and vomiting.   Genitourinary:  Positive for flank pain. Negative for dysuria and vaginal bleeding.       Physical Exam     Initial Vitals [24 1058]   BP Pulse Resp Temp SpO2   109/68 100 18 99.9 °F (37.7 °C) 100 %      MAP       --         Physical Exam    Vitals reviewed.  Constitutional: She appears well-developed and well-nourished. She is active. She does not have  a sickly appearance. She does not appear ill. No distress.   HENT:   Head: Normocephalic and atraumatic.   Neck:   Normal range of motion.  Pulmonary/Chest: [place] normal.   Abdominal:   Bilateral CVA tenderness, right more than left.  Mildly reproducible tenderness to right flank.  Denying abdominal tenderness with no guarding.   Musculoskeletal:      Cervical back: Normal range of motion.     Neurological: She is alert. GCS eye subscore is 4. GCS verbal subscore is 5. GCS motor subscore is 6.   Skin: Skin is warm and dry.   Psychiatric: She has a normal mood and affect. Her speech is normal and behavior is normal.         ED Course   Procedures  Labs Reviewed   URINALYSIS, REFLEX TO URINE CULTURE - Abnormal; Notable for the following components:       Result Value    Appearance, UA Hazy (*)     Protein, UA 2+ (*)     Occult Blood UA 3+ (*)     Nitrite, UA Positive (*)     Leukocytes, UA 2+ (*)     All other components within normal limits    Narrative:     Preferred Collection Type->Urine, Clean Catch  Specimen Source->Urine   CBC W/ AUTO DIFFERENTIAL - Abnormal; Notable for the following components:    RBC 5.58 (*)     MCV 78 (*)     MCH 24.6 (*)     MCHC 31.4 (*)     RDW 14.6 (*)     All other components within normal limits   COMPREHENSIVE METABOLIC PANEL - Abnormal; Notable for the following components:    Total Protein 9.0 (*)     All other components within normal limits   URINALYSIS MICROSCOPIC - Abnormal; Notable for the following components:    RBC, UA 20 (*)     WBC, UA >100 (*)     Bacteria Moderate (*)     All other components within normal limits    Narrative:     Preferred Collection Type->Urine, Clean Catch  Specimen Source->Urine   CULTURE, URINE   LIPASE   POCT URINE PREGNANCY          Imaging Results               CT Renal Stone Study ABD Pelvis WO (Final result)  Result time 06/21/24 13:41:08      Final result by Zafar Banks MD (06/21/24 13:41:08)                   Impression:       Left-sided hydroureteronephrosis with redemonstration of patulous left ureter.  Wall thickening raises concern for infection.  Correlation with urinalysis.  Etiology of chronic hydronephrosis is not identified.  Outpatient urology consultation would be recommended as needed.    This report was flagged in Epic as abnormal.    All CT scans at this facility are performed  using dose modulation techniques as appropriate to performed exam including the following:  automated exposure control; adjustment of mA and/or kV according to the patients size (this includes techniques or standardized protocols for targeted exams where dose is matched to indication/reason for exam: i.e. extremities or head);  iterative reconstruction technique.      Electronically signed by: Zafar Banks  Date:    06/21/2024  Time:    13:41               Narrative:    EXAMINATION:  CT RENAL STONE STUDY ABD PELVIS WO    CLINICAL HISTORY:  Flank pain, kidney stone suspected;    TECHNIQUE:  Low dose axial images, sagittal and coronal reformations were obtained from the lung bases to the pubic symphysis.  Contrast was not administered.    COMPARISON:  Multiple priors    FINDINGS:  Heart: Normal in size. No pericardial effusion.    Lung Bases: Well aerated, without consolidation or pleural fluid.    Liver: Normal in size and attenuation, with no focal hepatic lesions.    Gallbladder: No calcified gallstones.    Bile Ducts: No evidence of dilated ducts.    Pancreas: No mass or peripancreatic fat stranding.    Spleen: Unremarkable.    Adrenals: Unremarkable.    Kidneys/ Ureters: Patulous left ureter with associated wall thickening concerning for infection.  Correlation with urinalysis is advised.  Removal of the previous double-J ureteral stent.  Outpatient consultation with urology if not previously obtained.  Question punctate nonobstructive right nephrolithiasis.    Bladder: No evidence of wall thickening.    Reproductive organs: Unremarkable.    GI  Tract/Mesentery: No evidence of bowel obstruction or inflammation.  No evidence of appendicitis.    Peritoneal Space: No ascites. No free air.    Retroperitoneum: No significant adenopathy.    Abdominal wall: Unremarkable.    Vasculature: No significant atherosclerosis or aneurysm.    Bones: No acute fracture.  Osseous degenerative changes.                                       Medications   sodium chloride 0.9% bolus 1,000 mL 1,000 mL (1,000 mLs Intravenous New Bag 6/21/24 1233)   ketorolac injection 15 mg (15 mg Intravenous Given 6/21/24 1233)   cefTRIAXone (Rocephin) 1 g in D5W 100 mL IVPB (MB+) (1 g Intravenous New Bag 6/21/24 1328)     Medical Decision Making  Patient presents with concern of right-sided flank and back pain that began yesterday.  +fever.  No nausea or vomiting.  Temp on arrival 99.9.  Remaining vitals grossly unremarkable.  Patient in no distress on exam.  +CVA tenderness, right greater than left    DDx:  Including but not limited to UTI, pyelonephritis, nephrolithiasis, urolithiasis, GERD, intestinal spasm, gastroenteritis, gastritis, ulcer, cholecystitis, cholelithiasis, gallstones, pancreatitis, ileus, small bowel obstruction, appendicitis, diverticulitis, colitis, constipation, intestinal gas pain.        Amount and/or Complexity of Data Reviewed  Labs: ordered. Decision-making details documented in ED Course.  Radiology: ordered. Decision-making details documented in ED Course.    Risk  Prescription drug management.               ED Course as of 06/21/24 1425   Fri Jun 21, 2024 1127 Temp: 99.9 °F (37.7 °C) [KS]   1127 Pulse: 100 [KS]   1127 Resp: 18 [KS]   1127 SpO2: 100 % [KS]   1224 Comprehensive metabolic panel(!)  Unremarkable [KS]   1224 Lipase  WNL [KS]   1224 CBC auto differential(!)  Grossly unremarkable.  No elevation WBC [KS]   1241 POCT urine pregnancy  Negative [KS]   1300 Urinalysis Microscopic(!)  Concerning for urinary infection, noting nitrite positive, >100 WBC with  moderate bacteria.  Order placed for Rocephin.  CT renal pending. [KS]   1420 CT Renal Stone Study ABD Pelvis WO(!)  CT renal per Radiology review showing left-sided hydroureteronephrosis with redemonstration of patulous left ureter.  Wall thickening raises concern for infection.  Correlation with urinalysis.  Etiology of chronic hydronephrosis is not identified.  Outpatient urology consultation would be recommended as needed.   [KS]   1421 Results were discussed with patient, who continues to rest comfortably in ED and in no apparent distress.  Pain symptoms well controlled.  Will continue with supportive care.  Given IV Rocephin in ED in ED along with prescription for Augmentin.  Encouraged home Tylenol/ibuprofen as needed, PCP/urology follow-up.  ED return precautions were discussed at length.  Patient states her understanding and agrees with plan. [KS]   1422 Patient discussed with attending, Dr. Higgins, who agrees with ED course and dispo. [KS]      ED Course User Index  [KS] Michael Self PA-C                           Clinical Impression:  Final diagnoses:  [N12] Pyelonephritis (Primary)          ED Disposition Condition    Discharge Stable          ED Prescriptions       Medication Sig Dispense Start Date End Date Auth. Provider    amoxicillin-clavulanate 875-125mg (AUGMENTIN) 875-125 mg per tablet Take 1 tablet by mouth 2 (two) times daily. 14 tablet 6/21/2024 -- Michael Self PA-C          Follow-up Information       Follow up With Specialties Details Why Contact Info    Jossy Hein MD Internal Medicine   [address] [place] [address]  [id number]  [facility] [address]  [phone]      Your Urologist                 Michael Self PA-C  06/21/24 1422       Michael Self PA-C  06/21/24 1421

## 2024-06-21 NOTE — DISCHARGE INSTRUCTIONS

## 2024-06-22 LAB
BACTERIA UR CULT: NORMAL
BACTERIA UR CULT: NORMAL

## 2025-01-24 DIAGNOSIS — Z12.31 ENCOUNTER FOR SCREENING MAMMOGRAM FOR MALIGNANT NEOPLASM OF BREAST: Primary | ICD-10-CM

## 2025-04-25 DIAGNOSIS — N12: Primary | ICD-10-CM
